# Patient Record
Sex: FEMALE | Race: BLACK OR AFRICAN AMERICAN | Employment: FULL TIME | URBAN - METROPOLITAN AREA
[De-identification: names, ages, dates, MRNs, and addresses within clinical notes are randomized per-mention and may not be internally consistent; named-entity substitution may affect disease eponyms.]

---

## 2017-01-08 ENCOUNTER — HOSPITAL ENCOUNTER (EMERGENCY)
Age: 47
Discharge: HOME | End: 2017-01-08
Attending: EMERGENCY MEDICINE
Payer: COMMERCIAL

## 2017-01-08 VITALS
BODY MASS INDEX: 35.85 KG/M2 | HEART RATE: 103 BPM | DIASTOLIC BLOOD PRESSURE: 88 MMHG | TEMPERATURE: 98.4 F | HEIGHT: 64 IN | SYSTOLIC BLOOD PRESSURE: 133 MMHG | WEIGHT: 210 LBS | RESPIRATION RATE: 16 BRPM | OXYGEN SATURATION: 100 %

## 2017-01-08 DIAGNOSIS — R52 BODY ACHES: ICD-10-CM

## 2017-01-08 DIAGNOSIS — J02.0 ACUTE STREPTOCOCCAL PHARYNGITIS: Primary | ICD-10-CM

## 2017-01-08 LAB
DEPRECATED S PYO AG THROAT QL EIA: POSITIVE
FLUAV AG NPH QL IA: NEGATIVE
FLUBV AG NOSE QL IA: NEGATIVE

## 2017-01-08 PROCEDURE — 87804 INFLUENZA ASSAY W/OPTIC: CPT | Performed by: EMERGENCY MEDICINE

## 2017-01-08 PROCEDURE — 87880 STREP A ASSAY W/OPTIC: CPT | Performed by: EMERGENCY MEDICINE

## 2017-01-08 PROCEDURE — 99282 EMERGENCY DEPT VISIT SF MDM: CPT

## 2017-01-08 RX ORDER — AMOXICILLIN 875 MG/1
875 TABLET, FILM COATED ORAL 2 TIMES DAILY
Qty: 20 TAB | Refills: 0 | Status: SHIPPED | OUTPATIENT
Start: 2017-01-08 | End: 2017-01-18

## 2017-01-08 RX ORDER — IBUPROFEN 600 MG/1
600 TABLET ORAL
Qty: 20 TAB | Refills: 0 | Status: ON HOLD | OUTPATIENT
Start: 2017-01-08 | End: 2019-11-27 | Stop reason: SDUPTHER

## 2017-01-08 NOTE — LETTER
[facility] EMERGENCY DEPT 
[address] [facility] [address] [id number] [phone] Work/School Note Date: 1/8/2017 To Whom It May concern: Vivian Magdaleno was seen and treated today in the emergency room by the following provider(s): 
Nurse Practitioner: Milady Cohen NP. Vivian Magdaleno may return to work on  Jan 11. Sincerely, Milady Cohen NP

## 2017-01-08 NOTE — DISCHARGE INSTRUCTIONS
Sore Throat: Care Instructions  Your Care Instructions    Infection by bacteria or a virus causes most sore throats. Cigarette smoke, dry air, air pollution, allergies, and yelling can also cause a sore throat. Sore throats can be painful and annoying. Fortunately, most sore throats go away on their own. If you have a bacterial infection, your doctor may prescribe antibiotics. Follow-up care is a key part of your treatment and safety. Be sure to make and go to all appointments, and call your doctor if you are having problems. It's also a good idea to know your test results and keep a list of the medicines you take. How can you care for yourself at home? · If your doctor prescribed antibiotics, take them as directed. Do not stop taking them just because you feel better. You need to take the full course of antibiotics. · Gargle with warm salt water once an hour to help reduce swelling and relieve discomfort. Use 1 teaspoon of salt mixed in 1 cup of warm water. · Take an over-the-counter pain medicine, such as acetaminophen (Tylenol), ibuprofen (Advil, Motrin), or naproxen (Aleve). Read and follow all instructions on the label. · Be careful when taking over-the-counter cold or flu medicines and Tylenol at the same time. Many of these medicines have acetaminophen, which is Tylenol. Read the labels to make sure that you are not taking more than the recommended dose. Too much acetaminophen (Tylenol) can be harmful. · Drink plenty of fluids. Fluids may help soothe an irritated throat. Hot fluids, such as tea or soup, may help decrease throat pain. · Use over-the-counter throat lozenges to soothe pain. Regular cough drops or hard candy may also help. These should not be given to young children because of the risk of choking. · Do not smoke or allow others to smoke around you. If you need help quitting, talk to your doctor about stop-smoking programs and medicines.  These can increase your chances of quitting for good. · Use a vaporizer or humidifier to add moisture to your bedroom. Follow the directions for cleaning the machine. When should you call for help? Call your doctor now or seek immediate medical care if:  · You have new or worse trouble swallowing. · Your sore throat gets much worse on one side. Watch closely for changes in your health, and be sure to contact your doctor if you do not get better as expected. Where can you learn more? Go to http://eden-jay.info/. Enter [phone] in the search box to learn more about \"Sore Throat: Care Instructions. \"  Current as of: July 29, 2016  Content Version: 11.1  © 0580-0801 [employer]. Care instructions adapted under license by [employer] (which disclaims liability or warranty for this information). If you have questions about a medical condition or this instruction, always ask your healthcare professional. [address] any warranty or liability for your use of this information.

## 2017-01-08 NOTE — ED NOTES
.See Nursing Assessment      Emergency Department Nursing Plan of Care       The Nursing Plan of Care is developed from the Nursing assessment and Emergency Department Attending provider initial evaluation. The plan of care may be reviewed in the ED Provider note.     The Plan of Care was developed with the following considerations:   Patient / Family readiness to learn indicated by:verbalized understanding  Persons(s) to be included in education: patient  Barriers to Learning/Limitations:No    Signed     Armand Levine RN    1/8/2017   3:52 PM

## 2017-01-08 NOTE — ED PROVIDER NOTES
Patient is a 42 y.o. female presenting with general illness and sore throat. The history is provided by the patient. No  was used. Generalized Body Aches   This is a new problem. The current episode started yesterday. The problem occurs daily. The problem has not changed since onset. Pertinent negatives include no chest pain, no abdominal pain, no headaches and no shortness of breath. Nothing aggravates the symptoms. Nothing relieves the symptoms. She has tried nothing for the symptoms. Sore Throat    This is a new problem. The current episode started yesterday. There has been no fever. Pertinent negatives include no headaches, no shortness of breath, no swollen glands and no cough. She has had no exposure to strep. She has tried nothing for the symptoms. History reviewed. No pertinent past medical history. History reviewed. No pertinent past surgical history. History reviewed. No pertinent family history. Social History     Social History    Marital status:      Spouse name: N/A    Number of children: N/A    Years of education: N/A     Occupational History    Not on file. Social History Main Topics    Smoking status: Never Smoker    Smokeless tobacco: Not on file    Alcohol use Yes      Comment: social    Drug use: No    Sexual activity: Not on file     Other Topics Concern    Not on file     Social History Narrative         ALLERGIES: Review of patient's allergies indicates no known allergies. Review of Systems   Constitutional: Negative for fatigue and fever. HENT: Positive for sore throat. Respiratory: Negative for cough, shortness of breath and wheezing. Cardiovascular: Negative for chest pain and palpitations. Gastrointestinal: Negative for abdominal pain. Musculoskeletal: Negative for arthralgias, myalgias, neck pain and neck stiffness. Skin: Negative for pallor and rash.    Neurological: Negative for dizziness, tremors, weakness and headaches. Hematological: Negative for adenopathy. Psychiatric/Behavioral: Negative for agitation and behavioral problems. All other systems reviewed and are negative. Vitals:    01/08/17 1539   BP: 133/88   Pulse: (!) 103   Resp: 16   Temp: 98.4 °F (36.9 °C)   SpO2: 100%   Weight: 95.3 kg (210 lb)   Height: 5' 4\" (1.626 m)            Physical Exam   Constitutional: She is oriented to person, place, and time. She appears well-developed and well-nourished. No distress. HENT:   Head: Normocephalic and atraumatic. Right Ear: External ear normal.   Left Ear: External ear normal.   Nose: Nose normal.   Mouth/Throat: No oropharyngeal exudate. Posterior oropharynx erythema   Eyes: Conjunctivae are normal.   Neck: Normal range of motion. Neck supple. Cardiovascular: Normal rate, regular rhythm and normal heart sounds. Pulmonary/Chest: Effort normal and breath sounds normal. No respiratory distress. She has no wheezes. Abdominal: Soft. Bowel sounds are normal. There is no tenderness. Musculoskeletal: Normal range of motion. Lymphadenopathy:     She has no cervical adenopathy. Neurological: She is alert and oriented to person, place, and time. No cranial nerve deficit. Coordination normal.   Skin: Skin is warm and dry. No rash noted. Psychiatric: She has a normal mood and affect. Her behavior is normal. Judgment and thought content normal.   Nursing note and vitals reviewed. MDM  Number of Diagnoses or Management Options  Diagnosis management comments: DDX sore throat viral v bacterial  influenza    ED Course       Procedures    IMPRESSION  Strep pharyngitis  PLAN  Amoxicillin  Motrin  Follow up PCP  4:40 PM  I have discussed with patient their diagnosis, treatment, and follow up plan. The patient agrees to follow up as outlined in discharge paperwork and also to return to the ED with any worsening.  Marv Villagran NP

## 2017-01-18 ENCOUNTER — HOSPITAL ENCOUNTER (OUTPATIENT)
Dept: MAMMOGRAPHY | Age: 47
Discharge: HOME | End: 2017-01-18
Attending: INTERNAL MEDICINE
Payer: COMMERCIAL

## 2017-01-18 DIAGNOSIS — Z12.31 VISIT FOR SCREENING MAMMOGRAM: ICD-10-CM

## 2017-01-18 PROCEDURE — 77067 SCR MAMMO BI INCL CAD: CPT

## 2019-09-04 ENCOUNTER — OFFICE VISIT (OUTPATIENT)
Dept: OBGYN CLINIC | Age: 49
End: 2019-09-04

## 2019-09-04 VITALS
DIASTOLIC BLOOD PRESSURE: 84 MMHG | RESPIRATION RATE: 18 BRPM | WEIGHT: 166 LBS | SYSTOLIC BLOOD PRESSURE: 130 MMHG | HEART RATE: 84 BPM | HEIGHT: 64 IN | OXYGEN SATURATION: 98 % | BODY MASS INDEX: 28.34 KG/M2

## 2019-09-04 DIAGNOSIS — N93.9 ABNORMAL UTERINE BLEEDING (AUB): Primary | ICD-10-CM

## 2019-09-04 RX ORDER — MEDROXYPROGESTERONE ACETATE 10 MG/1
10 TABLET ORAL DAILY
Qty: 30 TAB | Refills: 4 | Status: SHIPPED | OUTPATIENT
Start: 2019-09-04 | End: 2019-11-22 | Stop reason: SDUPTHER

## 2019-09-04 NOTE — PROGRESS NOTES
Abnormal bleeding note      Rosalio Terry is a 54 y.o. female who complains of vaginal bleeding problems. Patient's periods have always been heavy. Regular periods around the same time each month. Very heavy. Last about 7 days. At times will restart bleeding about a week later. Patient has to use 2-3 pads, and often bleeds through pads. Lots of large clots. Periods are painful. Pain not only with clots. Her current method of family planning is tubal ligation. Patient anemic. Patient is currently on iron supplementation by PCP. Associated symptoms include none. Alleviating factors: none    Aggravating factors: none      The patient is sexually active. Last Pap smear: about 2 years per pt report. No hx of abnormal pap smears. Normal thyroid labs at PCP. PSH: BTL  POB: , SAB x1,  x3, tested to 8lb 13oz  PGYN: Hx of Trich in the past.  No abnormal paps. Due to come on her menstrual cycle around the 17th of this month. Her relevant past medical history: History reviewed. No pertinent past medical history. Past Surgical History:   Procedure Laterality Date    RAMAKRISHNA US BX BREAST RT 1ST LESION W/CLIP AND SPECIMEN Right     Neg     Social History     Occupational History    Not on file   Tobacco Use    Smoking status: Never Smoker    Smokeless tobacco: Never Used   Substance and Sexual Activity    Alcohol use: Yes     Comment: social    Drug use: No    Sexual activity: Yes     Partners: Male     History reviewed. No pertinent family history. No Known Allergies  Prior to Admission medications    Medication Sig Start Date End Date Taking? Authorizing Provider   ibuprofen (MOTRIN) 600 mg tablet Take 1 Tab by mouth every six (6) hours as needed for Pain.  17  Yes Latanya Townsend NP        Review of Systems - History obtained from the patient  Constitutional: negative for weight loss, fever, night sweats  HEENT: negative for hearing loss, earache, congestion, snoring, sorethroat  CV: negative for chest pain, palpitations, edema  Resp: negative for cough, shortness of breath, wheezing  Breast: negative for breast lumps, nipple discharge, galactorrhea  GI: negative for change in bowel habits, abdominal pain, black or bloody stools  : negative for frequency, dysuria, hematuria  MSK: negative for back pain, joint pain, muscle pain  Skin: negative for itching, rash, hives  Neuro: negative for dizziness, headache, confusion, weakness  Psych: negative for anxiety, depression, change in mood  Heme/lymph: negative for bleeding, bruising, pallor      Objective:    Visit Vitals  /84 (BP 1 Location: Right arm, BP Patient Position: Sitting)   Pulse 84   Resp 18   Ht 5' 4\" (1.626 m)   Wt 166 lb (75.3 kg)   SpO2 98%   BMI 28.49 kg/m²          PHYSICAL EXAMINATION    Constitutional  · Appearance: well-nourished, well developed, alert, in no acute distress    HENT  · Head and Face: appears normal    Neck  · Inspection/Palpation: normal appearance, no masses or tenderness  · Lymph Nodes: no lymphadenopathy present  · Thyroid: gland size normal, nontender, no nodules or masses present on palpation    Gastrointestinal  · Abdominal Examination: abdomen non-tender to palpation, normal bowel sounds, no masses present  · Liver and spleen: no hepatomegaly present, spleen not palpable  · Hernias: no hernias identified    Genitourinary  · External Genitalia: normal appearance for age, no discharge present, no tenderness present, no inflammatory lesions present, no masses present, no atrophy present  · Vagina: normal vaginal vault without central or paravaginal defects, no discharge present, no inflammatory lesions present, no masses present  · Bladder: non-tender to palpation  · Urethra: appears normal  · Cervix: normal   · Uterus: 12 week size uterus with anterior and right fundal fibroid.   Mildly tender  · Adnexa: no adnexal tenderness present, no adnexal masses present  · Perineum: perineum within normal limits, no evidence of trauma, no rashes or skin lesions present  · Anus: anus within normal limits, no hemorrhoids present  · Inguinal Lymph Nodes: no lymphadenopathy present    Skin  · General Inspection: no rash, no lesions identified    Neurologic/Psychiatric  · Mental Status:  · Orientation: grossly oriented to person, place and time  · Mood and Affect: mood normal, affect appropriate    Assessment:   54 y.o. with aub    Plan:   - provera 10mg daily  - ultrasound  - discussed options for control of periods. Will make final decision after ultrasound  - will get records of labs from PCP    Instructions given to pt. Handouts given to pt.     RTC in 2 weeks

## 2019-09-04 NOTE — PROGRESS NOTES
Chief Complaint   Patient presents with    Vaginal Bleeding     Pt reports her periods are once a month but she passes clots and there painful.    3 most recent PHQ Screens 9/4/2019   Little interest or pleasure in doing things Not at all   Feeling down, depressed, irritable, or hopeless Several days   Total Score PHQ 2 1     1. Have you been to the ER, urgent care clinic since your last visit? Hospitalized since your last visit? No    2. Have you seen or consulted any other health care providers outside of the [address] [place] [address] since your last visit? Include any pap smears or colon screening.  No

## 2019-09-04 NOTE — PATIENT INSTRUCTIONS
Abnormal Uterine Bleeding: Care Instructions  Your Care Instructions    Abnormal uterine bleeding (AUB) is irregular bleeding from the uterus that is longer or heavier than usual or does not occur at your regular time. Sometimes it is caused by changes in hormone levels. It can also be caused by growths in the uterus, such as fibroids or polyps. Sometimes a cause cannot be found. You may have heavy bleeding when you are not expecting your period. Your doctor may suggest a pregnancy test, if you think you are pregnant. Follow-up care is a key part of your treatment and safety. Be sure to make and go to all appointments, and call your doctor if you are having problems. It's also a good idea to know your test results and keep a list of the medicines you take. How can you care for yourself at home? · Be safe with medicines. Take pain medicines exactly as directed. ? If the doctor gave you a prescription medicine for pain, take it as prescribed. ? If you are not taking a prescription pain medicine, ask your doctor if you can take an over-the-counter medicine. · You may be low in iron because of blood loss. Eat a balanced diet that is high in iron and vitamin C. Foods rich in iron include red meat, shellfish, eggs, beans, and leafy green vegetables. Talk to your doctor about whether you need to take iron pills or a multivitamin. When should you call for help? Call 911 anytime you think you may need emergency care. For example, call if:    · You passed out (lost consciousness).    Call your doctor now or seek immediate medical care if:    · You have new or worse belly or pelvic pain.     · You have severe vaginal bleeding.     · You feel dizzy or lightheaded, or you feel like you may faint.    Watch closely for changes in your health, and be sure to contact your doctor if:    · You think you may be pregnant.     · Your bleeding gets worse.     · You do not get better as expected.    Where can you learn more?  Go to http://eden-jay.info/. Enter J397 in the search box to learn more about \"Abnormal Uterine Bleeding: Care Instructions. \"  Current as of: February 19, 2019  Content Version: 12.1  © 2711-6430 [employer]. Care instructions adapted under license by [employer] (which disclaims liability or warranty for this information). If you have questions about a medical condition or this instruction, always ask your healthcare professional. [address] any warranty or liability for your use of this information. Pelvic Ultrasound for Women: About This Test  What is it? A pelvic ultrasound test uses sound waves to make a picture of the inside of the lower belly (pelvis). It allows your doctor to see your bladder, cervix, uterus, fallopian tubes, and ovaries. The sound waves create a picture on a video monitor. The test can be done in two ways:  · Transabdominal. A small handheld device (transducer) is passed back and forth over your lower belly. · Transvaginal. A thin, lubricated transducer is placed in your vagina. Why is this test done? A pelvic ultrasound test is done to:  · Find the cause of urinary problems. · Find out what's causing pelvic pain. · Look for causes of vaginal bleeding and menstrual problems. · Check for growths or masses like ovarian cysts or uterine fibroids. · See if a fertilized egg is growing outside the uterus. This is called a tubal pregnancy. · Confirm the stage of a pregnancy and check the baby's heartbeat. · Look for the correct placement of an intrauterine device (IUD). How can you prepare for the test?  · If you are having a transabdominal ultrasound, your doctor will ask you to drink 4 to 6 glasses of juice or water about an hour before the test. This will fill your bladder. If you can't fill your bladder, it can be filled with water through a thin, flexible tube (catheter).   · If you are having both transabdominal and transvaginal ultrasounds done, you'll start with a full bladder. You will be asked to empty it before the transvaginal ultrasound. What happens before the test?  · You will need to remove any jewelry that might be in the way of the ultrasound. · You will need to take off most of your clothes below the waist. You'll be given a gown to wear during the test.  What happens during the test?  For a transabdominal ultrasound:  · You lie down on your back on an exam table. · A warm gel will be spread on your lower belly. This improves the transmission of the sound waves. The handheld transducer is pressed against your belly and gently moved back and forth. A picture of the organs can be seen on a video monitor. For a transvaginal ultrasound:  · You lie down on your back on an exam table with your hips slightly raised. · The tip of a thin, lubricated transducer probe is gently inserted into your vagina. The transducer may be moved around to get a complete view. The images from the test are shown on a video monitor. What else should you know about the test?  · With a transabdominal ultrasound, you will feel light pressure from the transducer as it passes over your belly. If you have an injury or pelvic pain, the pressure may be painful. · With a transvaginal ultrasound, you may feel some discomfort from the transducer probe as it is put into your vagina. · You will not hear or feel the sound waves. How long does the test take? · The transabdominal ultrasound test will take about 30 minutes. · The transvaginal ultrasound test will take 15 to 30 minutes. What happens after the test?  · You will probably be able to go home right away. · You can go back to your usual activities right away. Follow-up care is a key part of your treatment and safety. Be sure to make and go to all appointments, and call your doctor if you are having problems.  It's also a good idea to keep a list of the medicines you take. Ask your doctor when you can expect to have your test results. Where can you learn more? Go to http://eden-jay.info/. Enter H739 in the search box to learn more about \"Pelvic Ultrasound for Women: About This Test.\"  Current as of: February 19, 2019  Content Version: 12.1  © 5703-4663 [employer]. Care instructions adapted under license by [employer] (which disclaims liability or warranty for this information). If you have questions about a medical condition or this instruction, always ask your healthcare professional. [address] any warranty or liability for your use of this information. Medroxyprogesterone (By mouth)   Medroxyprogesterone ([id number]-OPAL-[id number]-one)  Treats menstruation problems caused by a hormone imbalance. Prevents overgrowth of the uterine lining in women who are taking estrogen. Brand Name(s): Provera   There may be other brand names for this medicine. When This Medicine Should Not Be Used: This medicine is not right for everyone. Do not use it if you had an allergic reaction to medroxyprogesterone, if you are pregnant, or if you have liver disease, unusual vaginal bleeding that has not been checked by a doctor, or a history of breast cancer or blood clots. How to Use This Medicine:   Tablet  · Take your medicine as directed. Your dose may need to be changed several times to find what works best for you. · Read and follow the patient instructions that come with this medicine. Talk to your doctor or pharmacist if you have any questions. · Missed dose: Take a dose as soon as you remember. If it is almost time for your next dose, wait until then and take a regular dose. Do not take extra medicine to make up for a missed dose. · Store the medicine in a closed container at room temperature, away from heat, moisture, and direct light.   Drugs and Foods to Avoid:      Ask your doctor or pharmacist before using any other medicine, including over-the-counter medicines, vitamins, and herbal products. Warnings While Using This Medicine:   · It is not safe to take this medicine during pregnancy. It could harm an unborn baby. Tell your doctor right away if you become pregnant. · Tell your doctor if you are breastfeeding or if you have kidney disease, heart disease, asthma, diabetes, endometriosis, high blood pressure, high cholesterol, lupus, porphyria, migraines, thyroid problems, or a history of seizures or cancer. Tell your doctor if you smoke. · This medicine may increase your risk for the following:   ¨ Blood clots, which could lead to stroke or heart attack  ¨ Dementia (when used with estrogen in women older than 65)  ¨ Breast or endometrial cancer (when used with estrogen)  · Tell any doctor who treats you that you use this medicine. You may need to stop taking it before you have surgery or if you need to be on bedrest.  · Tell any doctor or dentist who treats you that you are using this medicine. This medicine may affect certain medical test results. · Your doctor will check your progress and the effects of this medicine at regular visits. Keep all appointments. · Keep all medicine out of the reach of children. Never share your medicine with anyone.   Possible Side Effects While Using This Medicine:   Call your doctor right away if you notice any of these side effects:  · Allergic reaction: Itching or hives, swelling in your face or hands, swelling or tingling in your mouth or throat, chest tightness, trouble breathing  · Breast lump, pain, or tenderness  · Chest pain, trouble breathing, coughing up blood  · Loss of vision, blurred vision  · Numbness or weakness on one side of your body, sudden or severe headache, problems with speech or walking, pain in your lower leg (calf)  · Rapid weight gain, swelling in your hands, ankles, or feet  · Severe or unusual vaginal bleeding  · Sudden and severe stomach pain, nausea, vomiting, fever, lightheadedness  · Yellow skin or eyes  If you notice these less serious side effects, talk with your doctor:   · Depression, headache, dizziness, trouble sleeping  · Light vaginal bleeding or spotting  · Mild stomach pain or cramps  If you notice other side effects that you think are caused by this medicine, tell your doctor. Call your doctor for medical advice about side effects. You may report side effects to FDA at 9-523-HEC-0571  © 2017 [address]  Information is for End User's use only and may not be sold, redistributed or otherwise used for commercial purposes. The above information is an  only. It is not intended as medical advice for individual conditions or treatments. Talk to your doctor, nurse or pharmacist before following any medical regimen to see if it is safe and effective for you.

## 2019-09-16 ENCOUNTER — HOSPITAL ENCOUNTER (OUTPATIENT)
Dept: ULTRASOUND IMAGING | Age: 49
Discharge: HOME | End: 2019-09-16
Attending: OBSTETRICS & GYNECOLOGY
Payer: COMMERCIAL

## 2019-09-16 DIAGNOSIS — N93.9 ABNORMAL UTERINE BLEEDING (AUB): ICD-10-CM

## 2019-09-16 PROCEDURE — 76856 US EXAM PELVIC COMPLETE: CPT

## 2019-09-16 PROCEDURE — 76830 TRANSVAGINAL US NON-OB: CPT

## 2019-09-26 ENCOUNTER — OFFICE VISIT (OUTPATIENT)
Dept: OBGYN CLINIC | Age: 49
End: 2019-09-26

## 2019-09-26 VITALS
DIASTOLIC BLOOD PRESSURE: 82 MMHG | TEMPERATURE: 98.2 F | RESPIRATION RATE: 18 BRPM | HEIGHT: 64 IN | SYSTOLIC BLOOD PRESSURE: 127 MMHG | OXYGEN SATURATION: 99 % | BODY MASS INDEX: 28.51 KG/M2 | WEIGHT: 167 LBS | HEART RATE: 82 BPM

## 2019-09-26 DIAGNOSIS — Z30.42 ENCOUNTER FOR DEPO-PROVERA CONTRACEPTION: ICD-10-CM

## 2019-09-26 DIAGNOSIS — N93.9 ABNORMAL UTERINE BLEEDING (AUB): Primary | ICD-10-CM

## 2019-09-26 DIAGNOSIS — D21.9 FIBROIDS: ICD-10-CM

## 2019-09-26 DIAGNOSIS — Z30.013 ENCOUNTER FOR INITIAL PRESCRIPTION OF INJECTABLE CONTRACEPTIVE: ICD-10-CM

## 2019-09-26 RX ORDER — MEDROXYPROGESTERONE ACETATE 150 MG/ML
150 INJECTION, SUSPENSION INTRAMUSCULAR ONCE
Qty: 1 ML | Refills: 0 | Status: SHIPPED | COMMUNITY
Start: 2019-09-26 | End: 2019-09-26

## 2019-09-26 RX ORDER — MEDROXYPROGESTERONE ACETATE 150 MG/ML
150 INJECTION, SUSPENSION INTRAMUSCULAR
Qty: 1 ML | Refills: 4 | Status: SHIPPED | OUTPATIENT
Start: 2019-09-26 | End: 2019-11-27

## 2019-09-26 NOTE — PROGRESS NOTES
Abnormal bleeding note      Dustin Wright is a 54 y.o. female for follow up of vaginal bleeding problems. Still had period with provera, and stayed on for 2 weeks. Ultrasound showed:  EXAM:  US PELV NON OBS, US TRANSVAGINAL     INDICATION:  Abnormal uterine bleeding     COMPARISON:  None.     TECHNIQUE: Transabdominal and transvaginal ultrasound of the female pelvis.     FINDINGS: Transabdominal ultrasound:  Urinary bladder: within normal limits.     Uterus: measures 9.5 x 6.2 x 7.0 cm. There are multiple uterine fibroids  including representative fibroids that measure 3.1 cm on the left, 3.0 cm in the  midline, and 3.2 cm on the right.     Endometrium: 6 mm     Right ovary: 1.5 x 1.3 x 2.2 cm. Blood flow is present in the right ovary. No  adnexal mass or cyst.     Left ovary: 2.8 x 1.8 x 1.2 cm. Blood flow is present in the left ovary. No  adnexal mass or cyst.     Free fluid: None.     Endovaginal ultrasound:   Uterus: measures 10.5 x 6.0 x 4.3 cm. There are multiple uterine fibroids  including representative fibroids that measure 2.7 cm on the left, 2.5 cm in the  midline, and 3.3 cm on the right.     Endometrium: Not seen due to uterine fibroids.     The ovaries are not seen due to bowel gas.     Free fluid: None.     IMPRESSION  IMPRESSION:   Multiple uterine fibroids measuring up to 3.3 cm. Otherwise, normal appearance  of the uterus and ovaries. From prior notes:  Patient's periods have always been heavy.     Regular periods around the same time each month. Very heavy. Last about 7 days. At times will restart bleeding about a week later. Patient has to use 2-3 pads, and often bleeds through pads. Lots of large clots. Periods are painful. Pain not only with clots.     Her current method of family planning is tubal ligation. Patient anemic.   Patient is currently on iron supplementation by PCP.         Associated symptoms include none.     Alleviating factors: none     Aggravating factors: none       The patient is sexually active.     Last Pap smear: about 2 years per pt report. No hx of abnormal pap smears.     Normal thyroid labs at PCP.     PSH: BTL  POB: [id number], SAB x1,  x3, tested to 8lb 13oz  PGYN: Hx of Trich in the past.  No abnormal paps.     Due to come on her menstrual cycle around the 17th of this month.       Her relevant past medical history: History reviewed. No pertinent past medical history. Past Surgical History:   Procedure Laterality Date    RAMAKRISHNA US BX BREAST RT 1ST LESION W/CLIP AND SPECIMEN Right     Neg     Social History     Occupational History    Not on file   Tobacco Use    Smoking status: Never Smoker    Smokeless tobacco: Never Used   Substance and Sexual Activity    Alcohol use: Yes     Comment: social    Drug use: No    Sexual activity: Yes     Partners: Male     History reviewed. No pertinent family history. No Known Allergies  Prior to Admission medications    Medication Sig Start Date End Date Taking? Authorizing Provider   medroxyPROGESTERone (PROVERA) 10 mg tablet Take 1 Tab by mouth daily. 19  Yes Geoff Lawson MD   ibuprofen (MOTRIN) 600 mg tablet Take 1 Tab by mouth every six (6) hours as needed for Pain.  17   Shelly Pap, NP        Review of Systems - History obtained from the patient  Constitutional: negative for weight loss, fever, night sweats  HEENT: negative for hearing loss, earache, congestion, snoring, sorethroat  CV: negative for chest pain, palpitations, edema  Resp: negative for cough, shortness of breath, wheezing  Breast: negative for breast lumps, nipple discharge, galactorrhea  GI: negative for change in bowel habits, abdominal pain, black or bloody stools  : negative for frequency, dysuria, hematuria  MSK: negative for back pain, joint pain, muscle pain  Skin: negative for itching, rash, hives  Neuro: negative for dizziness, headache, confusion, weakness  Psych: negative for anxiety, depression, change in mood  Heme/lymph: negative for bleeding, bruising, pallor      Objective:    Visit Vitals  /82 (BP 1 Location: Left arm, BP Patient Position: Sitting)   Pulse 82   Temp 98.2 °F (36.8 °C) (Oral)   Resp 18   Ht 5' 4\" (1.626 m)   Wt 167 lb (75.8 kg)   LMP 09/02/2019 (Approximate)   SpO2 99%   BMI 28.67 kg/m²          PHYSICAL EXAMINATION    Constitutional  · Appearance: well-nourished, well developed, alert, in no acute distress    HENT  · Head and Face: appears normal    Neck  · Inspection/Palpation: normal appearance, no masses or tenderness  · Lymph Nodes: no lymphadenopathy present  · Thyroid: gland size normal, nontender, no nodules or masses present on palpation    Gastrointestinal  · Abdominal Examination: abdomen non-tender to palpation, normal bowel sounds, no masses present  · Liver and spleen: no hepatomegaly present, spleen not palpable  · Hernias: no hernias identified    Genitourinary  Deferred    Skin  · General Inspection: no rash, no lesions identified    Neurologic/Psychiatric  · Mental Status:  · Orientation: grossly oriented to person, place and time  · Mood and Affect: mood normal, affect appropriate    Assessment:   54 y.o. with AUB and pain due to fibroids    Plan:   - discussed options including Depo-provera, mirena, ablation, and hysterectomy. Risks and benefits reviewed. Patient would like to try Depo for now. - prescription for DepoProvera given. - if fails DepoProvera, will get endometrial biopsy      Instructions given to pt. Handouts given to pt.     RTC 3 months

## 2019-09-26 NOTE — PROGRESS NOTES
Chief Complaint   Patient presents with    Ultrasound     Pt reports she's still bleeding despite taking the Provera. 3 most recent PHQ Screens 9/26/2019   Little interest or pleasure in doing things Not at all   Feeling down, depressed, irritable, or hopeless -   Total Score PHQ 2 -     1. Have you been to the ER, urgent care clinic since your last visit? Hospitalized since your last visit? No    2. Have you seen or consulted any other health care providers outside of the [address] [place] [address] since your last visit? Include any pap smears or colon screening. No     150 mg of Depo was  administered intramuscularly in the pt's LVG per Dr Albert Crowley verbal order  with verbal consent received from patient and two patient identifiers used. No UPT, within dates. Patient tolerated well with no reactions observed for ten minutes post injection. Next injection dates were written down for the patient and the patient was encouraged to schedule next injection at checkout. Patient verbalized understanding.     Lot # [id number]  Exp- 6/1/2021  [id number]

## 2019-09-26 NOTE — PATIENT INSTRUCTIONS
Learning About Birth Control: The Shot  What is the shot? The shot is used to prevent pregnancy. You get the shot in your upper arm or rear end (buttocks). The shot gives you a dose of the hormone progestin. The shot is often called by its brand name, Depo-Provera. Progestin prevents pregnancy in these ways: It thickens the mucus in the cervix. This makes it hard for sperm to travel into the uterus. It also thins the lining of the uterus, which makes it harder for a fertilized egg to attach to the uterus. Progestin can sometimes stop the ovaries from releasing an egg each month (ovulation). The shot provides birth control for 3 months at a time. You then need another shot. The shot may cause bone loss. Talk to your doctor about the risks and benefits. How well does it work? In the first year of use:  · When the shot is used exactly as directed, fewer than 1 woman out of 100 has an unplanned pregnancy. · When the shot is not used exactly as directed, 6 women out of 100 have an unplanned pregnancy. Be sure to tell your doctor about any health problems you have or medicines you take. He or she can help you choose the birth control method that is right for you. What are the advantages of the shot? · The shot is one of the most effective methods of birth control. · It's convenient. You need to get a shot only once every 3 months to prevent pregnancy. You don't have to interrupt sex to protect against pregnancy. · It prevents pregnancy for 3 months at a time. You don't have to worry about birth control for this time. · It's safe to use while breastfeeding. · The shot may reduce heavy bleeding and cramping. · The shot doesn't contain estrogen. So you can use it if you don't want to take estrogen or can't take estrogen because you have certain health problems or concerns. What are the disadvantages of the shot?   · The shot doesn't protect against sexually transmitted infections (STIs), such as herpes or HIV/AIDS. If you aren't sure if your sex partner might have an STI, use a condom to protect against disease. · The shot may cause bone loss in some women. Talk to your doctor about the risks and benefits. · The shot is needed every 3 months. Any side effects may last 3 months or longer. ? The shot may cause irregular periods, or you may have spotting between periods. You may also stop getting a period. Some women see having no period as an advantage. ? It may cause mood changes, less interest in sex, or weight gain. · If you want to get pregnant, it may take up to 18 months after you stop getting the shot. This is because the hormones the shot provided have to leave your system, and your body has to readjust.  Where can you learn more? Go to http://eden-jay.info/. Enter U973 in the search box to learn more about \"Learning About Birth Control: The Shot. \"  Current as of: May 29, 2019  Content Version: 12.2  © 0894-2562 [employer]. Care instructions adapted under license by [employer] (which disclaims liability or warranty for this information). If you have questions about a medical condition or this instruction, always ask your healthcare professional. [address] any warranty or liability for your use of this information.

## 2019-10-11 ENCOUNTER — TELEPHONE (OUTPATIENT)
Dept: OBGYN CLINIC | Age: 49
End: 2019-10-11

## 2019-10-11 NOTE — TELEPHONE ENCOUNTER
Called and advised pt per Dr. Alyssa Iyer that there can be some irregular bleeding when starting DepoProvera, especially in the first month, as her body adjusts. If there is no improvement within 1 month of the shot, she should come in for re-evaluation. Pt was appreciative an verbalized understanding.

## 2019-10-11 NOTE — TELEPHONE ENCOUNTER
There can be some irregular bleeding when starting DepoProvera, especially in the first month, as her body adjusts. If there is no improvement within 1 month of the shot, she should come in for re-evaluation. Thank you.

## 2019-10-11 NOTE — TELEPHONE ENCOUNTER
Pt called stating that she had depo on 9/26/19 and now the bleeding and clots have startyed again and she isn't due for depo until 12/12/19. She feels like it has gotten worse. Needs advice.     Return call [phone]

## 2019-11-01 ENCOUNTER — TELEPHONE (OUTPATIENT)
Dept: OBGYN CLINIC | Age: 49
End: 2019-11-01

## 2019-11-01 NOTE — TELEPHONE ENCOUNTER
Attempted to contact the patient, message received that the person cannot accept calls at this time.

## 2019-11-01 NOTE — TELEPHONE ENCOUNTER
Patient should schedule follow up visit for endometrial biopsy and to discuss alternative options for treatment. Thank you.

## 2019-11-01 NOTE — TELEPHONE ENCOUNTER
Pt state that she came in for her irregular cycle. She taken the Depo to help with her cycle, but it is not helping. She is still bleeding with clots and a lot of pain.  She would like the nurse to call her

## 2019-11-07 ENCOUNTER — TELEPHONE (OUTPATIENT)
Dept: OBGYN CLINIC | Age: 49
End: 2019-11-07

## 2019-11-07 RX ORDER — MEDROXYPROGESTERONE ACETATE 10 MG/1
10 TABLET ORAL DAILY
Qty: 30 TAB | Refills: 1 | Status: SHIPPED | OUTPATIENT
Start: 2019-11-07 | End: 2019-11-27

## 2019-11-07 NOTE — TELEPHONE ENCOUNTER
Patient called the office and stated she is passing huge clots pt desires something to stop the bleeding pt was advised provider would be made aware.

## 2019-11-08 NOTE — PROGRESS NOTES
Please disregard previous note from today dated 11/8/19 @ 9:51 am.      I spoke with the patient informing her that a Rx for Provera had been sent to the pharmacy for her. Pt stated that she has been on her cycle since 10/17/19. Pt states she has been bleeding everyday with \"huge clots. \" Pt is concerned, and states she will  the Rx from the pharmacy today. Pt informed that Dr. Jose You is in surgery today, but a message will be sent to him. Understanding was verbalized to all. Pt states may leave message on voicemail.

## 2019-11-08 NOTE — TELEPHONE ENCOUNTER
Spoke with the patient earlier today. Pt states she started bleeding on 10/17/19, and has been bleeding everyday since. Pt states she is passing \"huge clots. \"  Pt stated she will  Rx for Provera from the pharmacy today. Information was relayed to Dr. Albania Rios. May leave message on pt's voicemail. Pharmacy on file is correct.

## 2019-11-08 NOTE — PROGRESS NOTES
Left VM message for pt to check with her pharmacy regarding her call to the office earlier this week.

## 2019-11-13 ENCOUNTER — HOSPITAL ENCOUNTER (OUTPATIENT)
Dept: LAB | Age: 49
Discharge: HOME | End: 2019-11-13

## 2019-11-13 ENCOUNTER — OFFICE VISIT (OUTPATIENT)
Dept: OBGYN CLINIC | Age: 49
End: 2019-11-13

## 2019-11-13 VITALS
HEIGHT: 64 IN | DIASTOLIC BLOOD PRESSURE: 90 MMHG | SYSTOLIC BLOOD PRESSURE: 150 MMHG | HEART RATE: 103 BPM | WEIGHT: 166 LBS | RESPIRATION RATE: 18 BRPM | OXYGEN SATURATION: 98 % | BODY MASS INDEX: 28.34 KG/M2

## 2019-11-13 DIAGNOSIS — N93.9 ABNORMAL UTERINE BLEEDING (AUB): Primary | ICD-10-CM

## 2019-11-13 DIAGNOSIS — D21.9 FIBROIDS: ICD-10-CM

## 2019-11-13 NOTE — PROGRESS NOTES
Chief Complaint   Patient presents with    Procedure     EMB     Pt presents in office with c/o vaginal bleeding x 1 month. 3 most recent PHQ Screens 11/13/2019   Little interest or pleasure in doing things Not at all   Feeling down, depressed, irritable, or hopeless Not at all   Total Score PHQ 2 0     1. Have you been to the ER, urgent care clinic since your last visit? Hospitalized since your last visit? No    2. Have you seen or consulted any other health care providers outside of the [address] [place] [address] since your last visit? Include any pap smears or colon screening.  No

## 2019-11-13 NOTE — PROCEDURES
Endometrial biopsy    Verbal and written consent obtained. Patient placed in lithotomy position. Cervix visualized with a speculum, then sterilized with Betadine. Cervical os and anterior lip of cervix anesthetized with topical benzocaine. An attempt was made to pass the Explora curette through the cervical os failed. Anterior lip of cervix then grasped with a single tooth tenaculum. Explora curette was then able to be inserted into the uterine cavity. Uterus sounded to  10 cm. Sample was obtained from 360 degrees of rotation and all 4 quadrants. Moderate amount of sample obtained. Tenaculum removed from anterior cervical lip. Hemostasis was noted to be excellent. Speculum removed from vagina. Pt tolerated the procedure well, and there were no complications. Specimen sent to pathology.

## 2019-11-13 NOTE — PATIENT INSTRUCTIONS
Endometrial Biopsy: About This Test  What is it? An endometrial biopsy is a way for your doctor to take a small sample of the lining of the uterus (endometrium). The sample is looked at under a microscope for abnormal cells. An endometrial biopsy helps your doctor find problems in the endometrium. Why is this test done? An endometrial biopsy is done to check for cancer of the uterus. The test is also done if you have abnormal bleeding from your uterus. The test results show how your body's hormones are affecting the lining of the uterus, such as during menopause. How do you prepare for the test?  · If you take aspirin or some other blood thinner, ask your doctor if you should stop taking it before your test. Make sure that you understand exactly what your doctor wants you to do. These medicines increase the risk of bleeding. · Ask your doctor if you should take a pain reliever, such as ibuprofen (Advil or Motrin), 30 to 60 minutes before the test. This can help reduce any cramping pain that the test can cause. What happens before the test?  · You will empty your bladder just before the test.  · You will be asked to sign a consent form that says you understand the risks of the test and agree to have it done. How is the test done? · You will lie on an exam table. Your feet will be in stirrups. · The doctor may use a spray or injection to numb your cervix. The cervix is the opening to the uterus. · The doctor will use a tool called a speculum to see the cervix. · Then the doctor will pass a thin tube through the cervix to take a sample of the uterus lining. · The sample is sent to a lab. How long does the test take? The test will take about 5 to 15 minutes. What happens after the test?  · You will probably be able to go home right away. · You likely will have mild vaginal bleeding and may have cramps for a few days after the test. The cramps may feel like bad menstrual cramps.   Follow-up care is a key part of your treatment and safety. Be sure to make and go to all appointments, and call your doctor if you are having problems. It's also a good idea to keep a list of the medicines you take. Ask your doctor when you can expect to have your test results. Where can you learn more? Go to http://eden-jay.info/. Enter [phone] in the search box to learn more about \"Endometrial Biopsy: About This Test.\"  Current as of: February 19, 2019  Content Version: 12.2  © 0191-3262 [employer]. Care instructions adapted under license by [employer] (which disclaims liability or warranty for this information). If you have questions about a medical condition or this instruction, always ask your healthcare professional. [address] any warranty or liability for your use of this information. Laparoscopic Hysterectomy: Before Your Surgery  What is a laparoscopic hysterectomy? A hysterectomy is surgery to take out the uterus. In some cases, the ovaries and fallopian tubes also are taken out at the same time. The doctor makes one or more small cuts in the belly. These cuts are called incisions. They let the doctor insert tools to do the surgery. One of these tools is a tube with a light on it. It's called a laparoscope, or scope. The scope and the other tools allow the doctor to free the uterus. The doctor then removes the uterus through the small cuts. In a total hysterectomy, the doctor takes out the uterus and the cervix. In a supracervical hysterectomy, only the uterus is taken out. Most women go home in 1 to 2 days. You may need about 4 to 6 weeks to fully recover. After the surgery, you will not have periods. You will not be able to get pregnant. If there is a chance that you will want to have a baby, talk to your doctor about other treatment options. Your doctor may advise you to take hormone pills if your ovaries are removed.  Your doctor will talk to you about the risks and benefits of hormones. He or she will also tell you how long to take them. This surgery probably won't lower your interest in sex. In fact, some women enjoy sex more. This may be because they no longer have to worry about birth control or heavy bleeding. Follow-up care is a key part of your treatment and safety. Be sure to make and go to all appointments, and call your doctor if you are having problems. It's also a good idea to know your test results and keep a list of the medicines you take. What happens before surgery?   Surgery can be stressful. This information will help you understand what you can expect. And it will help you safely prepare for surgery.   Preparing for surgery    · Understand exactly what surgery is planned, along with the risks, benefits, and other options. · Tell your doctors ALL the medicines, vitamins, supplements, and herbal remedies you take. Some of these can increase the risk of bleeding or interact with anesthesia.     · If you take blood thinners, such as warfarin (Coumadin), clopidogrel (Plavix), or aspirin, be sure to talk to your doctor. He or she will tell you if you should stop taking these medicines before your surgery. Make sure that you understand exactly what your doctor wants you to do.     · Your doctor will tell you which medicines to take or stop before your surgery. You may need to stop taking certain medicines a week or more before surgery. So talk to your doctor as soon as you can.     · If you have an advance directive, let your doctor know. It may include a living will and a durable power of  for health care. Bring a copy to the hospital. If you don't have one, you may want to prepare one. It lets your doctor and loved ones know your health care wishes. Doctors advise that everyone prepare these papers before any type of surgery or procedure. What happens on the day of surgery?    · Follow the instructions exactly about when to stop eating and drinking. If you don't, your surgery may be canceled. If your doctor told you to take your medicines on the day of surgery, take them with only a sip of water.     · Take a bath or shower before you come in for your surgery. Do not apply lotions, perfumes, deodorants, or nail polish.     · Do not shave the surgical site yourself.     · Take off all jewelry and piercings. And take out contact lenses, if you wear them.    At the hospital or surgery center   · Bring a picture ID.     · The area for surgery is often marked to make sure there are no errors.     · You will be kept comfortable and safe by your anesthesia provider. You will be asleep during the surgery.     · The surgery will take about 2 to 4 hours. Going home   · Be sure you have someone to drive you home. Anesthesia and pain medicine make it unsafe for you to drive.     · You will be given more specific instructions about recovering from your surgery. They will cover things like diet, wound care, follow-up care, driving, and getting back to your normal routine. When should you call your doctor? · You have questions or concerns.     · You don't understand how to prepare for your surgery.     · You become ill before the surgery (such as fever, flu, or a cold).     · You need to reschedule or have changed your mind about having the surgery. Where can you learn more? Go to http://eden-jay.info/. Enter D130 in the search box to learn more about \"Laparoscopic Hysterectomy: Before Your Surgery. \"  Current as of: February 19, 2019  Content Version: 12.2  © 6607-6304 [employer]. Care instructions adapted under license by [employer] (which disclaims liability or warranty for this information).  If you have questions about a medical condition or this instruction, always ask your healthcare professional. [address] any warranty or liability for your use of this information. Laparoscopic Hysterectomy: What to Expect at [facility]    A hysterectomy is surgery to take out the uterus. In some cases, the ovaries and fallopian tubes also are taken out at the same time. You can expect to feel better and stronger each day, but you may need pain medicine for a week or two. You may get tired easily or have less energy than usual. The tiredness may last for several weeks after surgery. You will probably notice that your belly is swollen and puffy. This is common. The swelling will take several weeks to go down. You may take about 4 to 6 weeks to fully recover. It is important to avoid lifting while you are recovering so that you can heal.  This care sheet gives you a general idea about how long it will take for you to recover. But each person recovers at a different pace. Follow the steps below to get better as quickly as possible. How can you care for yourself at home? Activity    · Rest when you feel tired.     · Be active. Walking is a good choice.     · Allow the area to heal. Don't move quickly or lift anything heavy until you are feeling better.     · You may shower 24 to 48 hours after surgery, if your doctor okays it. Pat the incision dry. Do not take a bath for the first 2 weeks, or until your doctor tells you it is okay.     · Ask your doctor when it is okay for you to have sex. Diet    · You can eat your normal diet. If your stomach is upset, try bland, low-fat foods like plain rice, broiled chicken, toast, and yogurt.     · If your bowel movements are not regular right after surgery, try to avoid constipation and straining. Drink plenty of water. Your doctor may suggest fiber, a stool softener, or a mild laxative. Medicines    · Your doctor will tell you if and when you can restart your medicines.  He or she will also give you instructions about taking any new medicines.     · If you take blood thinners, such as warfarin (Coumadin), clopidogrel (Plavix), or aspirin, be sure to talk to your doctor. He or she will tell you if and when to start taking those medicines again. Make sure that you understand exactly what your doctor wants you to do.     · Be safe with medicines. Read and follow all instructions on the label. ? If the doctor gave you a prescription medicine for pain, take it as prescribed. ? If you are not taking a prescription pain medicine, ask your doctor if you can take an over-the-counter medicine.     · If your doctor prescribed antibiotics, take them as directed. Do not stop taking them just because you feel better. You need to take the full course of antibiotics. Incision care    · You may have stitches over the cuts (incisions) the doctor made in your belly.     · If you have strips of tape on the cut (incision) the doctor made, leave the tape on for a week or until it falls off.     · Wash the area daily with warm, soapy water, and pat it dry. Don't use hydrogen peroxide or alcohol. They can slow healing.     · You may cover the area with a gauze bandage if it oozes fluid or rubs against clothing.     · Change the bandage every day. Other instructions    · You may have some light vaginal bleeding. Wear sanitary pads if needed. Do not douche or use tampons.     · Don't have sex until the doctor says it is okay. Follow-up care is a key part of your treatment and safety. Be sure to make and go to all appointments, and call your doctor if you are having problems. It's also a good idea to know your test results and keep a list of the medicines you take. When should you call for help? Call 911 anytime you think you may need emergency care.  For example, call if:    · You passed out (lost consciousness).     · You have chest pain, are short of breath, or cough up blood.    Call your doctor now or seek immediate medical care if:    · You have pain that does not get better after you take pain medicine.     · You cannot pass stools or gas.     · You have vaginal discharge that has increased in amount or smells bad.     · You are sick to your stomach or cannot drink fluids.     · You have loose stitches, or your incision comes open.     · Bright red blood has soaked through the bandage over your incision.     · You have signs of infection, such as:  ? Increased pain, swelling, warmth, or redness. ? Red streaks leading from the incision. ? Pus draining from the incision. ? A fever.     · You have bright red vaginal bleeding that soaks one or more pads in an hour, or you have large clots.     · You have signs of a blood clot in your leg (called a deep vein thrombosis), such as:  ? Pain in your calf, back of the knee, thigh, or groin. ? Redness and swelling in your leg or groin.    Watch closely for changes in your health, and be sure to contact your doctor if you have any problems. Where can you learn more? Go to http://eden-jay.info/. Enter Q131 in the search box to learn more about \"Laparoscopic Hysterectomy: What to Expect at Home. \"  Current as of: February 19, 2019  Content Version: 12.2  © 6517-7766 [employer]. Care instructions adapted under license by [employer] (which disclaims liability or warranty for this information). If you have questions about a medical condition or this instruction, always ask your healthcare professional. [address] any warranty or liability for your use of this information.

## 2019-11-13 NOTE — PROGRESS NOTES
Abnormal bleeding note      Buddy Jamil is a 54 y.o. female for follow up of AUB and fibroids    Initially started on PO provera, but bled through this. Then started DepoProvera on 9/26/19. She has continued bleeding on this. Given supplemental PO provera earlier this week. From prior notes:  9/26/19 -   Still had period with provera, and stayed on for 2 weeks.     Ultrasound showed:     FINDINGS: Transabdominal ultrasound:  Uterus: measures 9.5 x 6.2 x 7.0 cm. There are multiple uterine fibroids  including representative fibroids that measure 3.1 cm on the left, 3.0 cm in the  midline, and 3.2 cm on the right.     Endometrium: 6 mm     Right ovary: 1.5 x 1.3 x 2.2 cm. Blood flow is present in the right ovary. No  adnexal mass or cyst.     Left ovary: 2.8 x 1.8 x 1.2 cm. Blood flow is present in the left ovary. No  adnexal mass or cyst.     Endovaginal ultrasound:   Uterus: measures 10.5 x 6.0 x 4.3 cm. There are multiple uterine fibroids  including representative fibroids that measure 2.7 cm on the left, 2.5 cm in the  midline, and 3.3 cm on the right.     Endometrium: Not seen due to uterine fibroids.     The ovaries are not seen due to bowel gas.     IMPRESSION:   Multiple uterine fibroids measuring up to 3.3 cm. Otherwise, normal appearance  of the uterus and ovaries.     9/4/19  Patient's periods have always been heavy.     Regular periods around the same time each month.  Very heavy.  Last about 7 days.  At times will restart bleeding about a week later. Angie Goodrich has to use 2-3 pads, and often bleeds through pads.  Lots of large clots.  Periods are painful.  Pain not only with clots.     Her current method of family planning is tubal ligation.     Patient anemic.  Patient is currently on iron supplementation by PCP.     Associated symptoms include none.     Alleviating factors: none     Aggravating factors: none       The patient is sexually active.     Last Pap smear: about 2 years per pt report.  No hx of abnormal pap smears.     Normal thyroid labs at PCP.     PSH: BTL  POB: , SAB x1,  x3, tested to 8lb 13oz  PGYN: Hx of Trich in the past.  No abnormal paps.       Her relevant past medical history: History reviewed. No pertinent past medical history. Past Surgical History:   Procedure Laterality Date    RAMAKRISHNA US BX BREAST RT 1ST LESION W/CLIP AND SPECIMEN Right     Neg     Social History     Occupational History    Not on file   Tobacco Use    Smoking status: Never Smoker    Smokeless tobacco: Never Used   Substance and Sexual Activity    Alcohol use: Yes     Comment: social    Drug use: No    Sexual activity: Yes     Partners: Male     History reviewed. No pertinent family history. No Known Allergies  Prior to Admission medications    Medication Sig Start Date End Date Taking? Authorizing Provider   medroxyPROGESTERone (PROVERA) 10 mg tablet Take 1 Tab by mouth daily. 19  Yes Erica Otero MD   medroxyPROGESTERone (DEPO-PROVERA) 150 mg/mL injection 1 mL by IntraMUSCular route every three (3) months. 19  Yes Eriac Otero MD   medroxyPROGESTERone (PROVERA) 10 mg tablet Take 1 Tab by mouth daily. 19  Yes Erica Otero MD   ibuprofen (MOTRIN) 600 mg tablet Take 1 Tab by mouth every six (6) hours as needed for Pain.  17  Yes Portia Romo NP        Review of Systems - History obtained from the patient  Constitutional: negative for weight loss, fever, night sweats  HEENT: negative for hearing loss, earache, congestion, snoring, sorethroat  CV: negative for chest pain, palpitations, edema  Resp: negative for cough, shortness of breath, wheezing  Breast: negative for breast lumps, nipple discharge, galactorrhea  GI: negative for change in bowel habits, abdominal pain, black or bloody stools  : negative for frequency, dysuria, hematuria  MSK: negative for back pain, joint pain, muscle pain  Skin: negative for itching, rash, hives  Neuro: negative for dizziness, headache, confusion, weakness  Psych: negative for anxiety, depression, change in mood  Heme/lymph: negative for bleeding, bruising, pallor      Objective:    Visit Vitals  /90 (BP 1 Location: Left arm, BP Patient Position: Sitting)   Pulse (!) 103   Resp 18   Ht 5' 4\" (1.626 m)   Wt 166 lb (75.3 kg)   SpO2 98%   BMI 28.49 kg/m²          PHYSICAL EXAMINATION    Constitutional  · Appearance: well-nourished, well developed, alert, in no acute distress    HENT  · Head and Face: appears normal    Neck  · Inspection/Palpation: normal appearance, no masses or tenderness  · Lymph Nodes: no lymphadenopathy present  · Thyroid: gland size normal, nontender, no nodules or masses present on palpation    Gastrointestinal  · Abdominal Examination: abdomen non-tender to palpation, normal bowel sounds, no masses present  · Liver and spleen: no hepatomegaly present, spleen not palpable  · Hernias: no hernias identified    Genitourinary  · External Genitalia: normal appearance for age, no discharge present, no tenderness present, no inflammatory lesions present, no masses present, no atrophy present  · Vagina: normal vaginal vault without central or paravaginal defects, 3 scopettes of blood and clots present, no inflammatory lesions present, no masses present  · Bladder: non-tender to palpation  · Urethra: appears normal  · Cervix: normal   · Uterus: 12 week size uterus with anterior and right fundal fibroid.     · Adnexa: no adnexal tenderness present, no adnexal masses present  · Perineum: perineum within normal limits, no evidence of trauma, no rashes or skin lesions present  · Anus: anus within normal limits, no hemorrhoids present  · Inguinal Lymph Nodes: no lymphadenopathy present    Skin  · General Inspection: no rash, no lesions identified    Neurologic/Psychiatric  · Mental Status:  · Orientation: grossly oriented to person, place and time  · Mood and Affect: mood normal, affect appropriate    Assessment:   54 y.o. with AUB from fibroids, has failed DepoProvera. Plan:   - endometrial biopsy today  - Patient desires definitive therapy. Desires hysterectomy. Will schedule for Robotic assisted total laparoscopic hysterectomy, bilateral salpingectomy. Risks and benefits of surgery reviewed. This includes pain, bleeding, infection, and risk of damage to surrounding organs. All questions answered. Consent signed. Instructions given to pt. Handouts given to pt. Spent greater than 25 minutes with patient, over 50% of which was spent counselling.

## 2019-11-18 NOTE — PROGRESS NOTES
Please let patient know that her biopsy was normal other than an endometrial polyp (benign overgrowth in the lining of the uterus). These tend to bleed consistently and may have been the cause of your bleeding. I am happy to proceed with the hysterectomy as planned, though a D&C to remove the polyp may stop the bleeding (along with continuing DepoProvera). If she would like to continue with the hysterectomy, then we will continue with the hysterectomy as planned. If she would prefer a D&C, then we can change the planned surgery. She just needs to let us know. Thank you.

## 2019-11-20 DIAGNOSIS — D21.9 FIBROIDS: ICD-10-CM

## 2019-11-20 DIAGNOSIS — N93.9 ABNORMAL UTERINE BLEEDING (AUB): Primary | ICD-10-CM

## 2019-11-22 ENCOUNTER — HOSPITAL ENCOUNTER (OUTPATIENT)
Dept: PREADMISSION TESTING | Age: 49
Discharge: HOME | End: 2019-11-22
Payer: COMMERCIAL

## 2019-11-22 ENCOUNTER — TELEPHONE (OUTPATIENT)
Dept: OBGYN CLINIC | Age: 49
End: 2019-11-22

## 2019-11-22 VITALS
WEIGHT: 167.5 LBS | RESPIRATION RATE: 18 BRPM | BODY MASS INDEX: 28.6 KG/M2 | OXYGEN SATURATION: 100 % | HEART RATE: 76 BPM | TEMPERATURE: 98.4 F | HEIGHT: 64 IN

## 2019-11-22 DIAGNOSIS — N93.9 ABNORMAL UTERINE BLEEDING (AUB): ICD-10-CM

## 2019-11-22 DIAGNOSIS — D21.9 FIBROIDS: ICD-10-CM

## 2019-11-22 LAB
ERYTHROCYTE [DISTWIDTH] IN BLOOD BY AUTOMATED COUNT: 18.1 % (ref 11.5–14.5)
HCT VFR BLD AUTO: 20.3 % (ref 35–47)
HGB BLD-MCNC: 5.8 G/DL (ref 11.5–16)
MCH RBC QN AUTO: 24.6 PG (ref 26–34)
MCHC RBC AUTO-ENTMCNC: 28.6 G/DL (ref 30–36.5)
MCV RBC AUTO: 86 FL (ref 80–99)
NRBC # BLD: 0.03 K/UL (ref 0–0.01)
NRBC BLD-RTO: 0.6 PER 100 WBC
PLATELET # BLD AUTO: 499 K/UL (ref 150–400)
PMV BLD AUTO: 9 FL (ref 8.9–12.9)
RBC # BLD AUTO: 2.36 M/UL (ref 3.8–5.2)
WBC # BLD AUTO: 5.3 K/UL (ref 3.6–11)

## 2019-11-22 PROCEDURE — 85027 COMPLETE CBC AUTOMATED: CPT

## 2019-11-22 PROCEDURE — 86923 COMPATIBILITY TEST ELECTRIC: CPT

## 2019-11-22 PROCEDURE — 86900 BLOOD TYPING SEROLOGIC ABO: CPT

## 2019-11-22 PROCEDURE — 36415 COLL VENOUS BLD VENIPUNCTURE: CPT

## 2019-11-22 RX ORDER — ALBUTEROL SULFATE 90 UG/1
1 AEROSOL, METERED RESPIRATORY (INHALATION)
COMMUNITY

## 2019-11-22 NOTE — PERIOP NOTES
PRE-ADMISSION TESTING INTERVIEW COMPLETED WITH PATIENT. INSTRUCTIONS GIVEN ON USE OF CHLORHEXIDINE SOLUTION THE EVENING BEFORE AND THE MORNING OF SURGERY. SUICIDE SCREENING COMPLETED AND PATIENT SAID THAT IN THE LAST MONTH SHE HAS HAD THOUGHTS OF HER OWN DEATH BUT DENIES EVER WANTING TO ACT ON THEM. STATED THAT IF HER PCP NEEDED TO BE CONTACTED SHE WOULD NOT OBJECT. STATES THAT IN THE MONTH OF November SHE HAS LOST MANY FRIENDS AND FAMILY OVER THE YEARS AND WITH THAT AND THE APPROACHING HOLIDAYS, SHE GETS SAD AND  THINKS ABOUT DYING. ADVISED PATIENT TO CALL PCP IF SHE FEELS THAT SADNESS IS TOO OVERWHELMING TO HANDLE ON HER OWN. SHE AGREED TO CALL FOR   THOUGHTS OF KILLING HERSELF.    11/25/2019 0931  Tried to reach patient's PCP, Dr Bennett Davis, but had to leave a message for a call back-gave brief description of reason for call.

## 2019-11-25 ENCOUNTER — TELEPHONE (OUTPATIENT)
Dept: OBGYN CLINIC | Age: 49
End: 2019-11-25

## 2019-11-25 RX ORDER — SODIUM CHLORIDE 9 MG/ML
250 INJECTION, SOLUTION INTRAVENOUS AS NEEDED
Status: DISCONTINUED | OUTPATIENT
Start: 2019-11-25 | End: 2019-11-27 | Stop reason: HOSPADM

## 2019-11-25 RX ORDER — DIPHENHYDRAMINE HCL 25 MG
25 CAPSULE ORAL
Status: DISCONTINUED | OUTPATIENT
Start: 2019-11-25 | End: 2019-11-26 | Stop reason: SDUPTHER

## 2019-11-25 RX ORDER — ACETAMINOPHEN 325 MG/1
650 TABLET ORAL
Status: DISCONTINUED | OUTPATIENT
Start: 2019-11-25 | End: 2019-11-27 | Stop reason: HOSPADM

## 2019-11-25 NOTE — TELEPHONE ENCOUNTER
Spoke to patient regarding her low Hgb prior to surgery. Offered admission tonight for transfusion, but patient declined due to prior responsibilities. She is able to come in early tomorrow however. Will plan to admit to floor and start transfusion prior to surgery, then keep patient overnight postop to monitor. Patient is in agreement with plan.

## 2019-11-25 NOTE — TELEPHONE ENCOUNTER
Dr. Elisa Persaud spoke with the patient today. Dr. Elisa Persaud also spoke with Atif Sorto today from [facility]. Charlee's PAT) regarding pt's hemoglobin results.

## 2019-11-25 NOTE — PERIOP NOTES
MARY IN DR. MARROQUIN' OFFICE NOTIFIED OF THE FOLLOWING ABNORMAL LAB VALUES:  HGB-5.8  HCT-20.3  RBC-2.36  PLATELETS-499K  SHE WILL NOTIFY DR. Pavel Teague. SURGERY IS SCHEDULED FOR TOMORROW (11/26/19). RESULTS HAVE BEEN FAXED TO OFFICE WITH CONFIRMATION RECEIPT RECEIVED.

## 2019-11-26 ENCOUNTER — HOSPITAL ENCOUNTER (OUTPATIENT)
Age: 49
Discharge: HOME | End: 2019-11-27
Attending: OBSTETRICS & GYNECOLOGY | Admitting: OBSTETRICS & GYNECOLOGY
Payer: MEDICAID

## 2019-11-26 ENCOUNTER — ANESTHESIA (OUTPATIENT)
Dept: SURGERY | Age: 49
End: 2019-11-26
Payer: MEDICAID

## 2019-11-26 ENCOUNTER — ANESTHESIA EVENT (OUTPATIENT)
Dept: SURGERY | Age: 49
End: 2019-11-26
Payer: MEDICAID

## 2019-11-26 DIAGNOSIS — D21.9 FIBROIDS: ICD-10-CM

## 2019-11-26 DIAGNOSIS — N93.9 ABNORMAL UTERINE BLEEDING (AUB): ICD-10-CM

## 2019-11-26 PROBLEM — D64.9 ANEMIA: Status: ACTIVE | Noted: 2019-11-26

## 2019-11-26 PROBLEM — N92.0 MENORRHAGIA: Status: ACTIVE | Noted: 2019-11-26

## 2019-11-26 LAB
ERYTHROCYTE [DISTWIDTH] IN BLOOD BY AUTOMATED COUNT: 16.8 % (ref 11.5–14.5)
HCT VFR BLD AUTO: 26.4 % (ref 35–47)
HGB BLD-MCNC: 6.3 G/DL (ref 11.5–16)
HGB BLD-MCNC: 6.5 G/DL (ref 11.5–16)
HGB BLD-MCNC: 7.3 G/DL (ref 11.5–16)
HGB BLD-MCNC: 7.4 G/DL (ref 11.5–16)
HGB BLD-MCNC: 8.2 G/DL (ref 11.5–16)
HGB BLD-MCNC: 8.2 G/DL (ref 11.5–16)
MCH RBC QN AUTO: 25.5 PG (ref 26–34)
MCHC RBC AUTO-ENTMCNC: 31.1 G/DL (ref 30–36.5)
MCV RBC AUTO: 82 FL (ref 80–99)
NRBC # BLD: 0 K/UL (ref 0–0.01)
NRBC BLD-RTO: 0 PER 100 WBC
PLATELET # BLD AUTO: 421 K/UL (ref 150–400)
PMV BLD AUTO: 9.2 FL (ref 8.9–12.9)
RBC # BLD AUTO: 3.22 M/UL (ref 3.8–5.2)
WBC # BLD AUTO: 10.9 K/UL (ref 3.6–11)

## 2019-11-26 PROCEDURE — 77030040361 HC SLV COMPR DVT MDII -B: Performed by: OBSTETRICS & GYNECOLOGY

## 2019-11-26 PROCEDURE — 74011000250 HC RX REV CODE- 250: Performed by: NURSE ANESTHETIST, CERTIFIED REGISTERED

## 2019-11-26 PROCEDURE — 77030018778 HC MANIP UTER VCAR CNMD -B: Performed by: OBSTETRICS & GYNECOLOGY

## 2019-11-26 PROCEDURE — 74011250636 HC RX REV CODE- 250/636: Performed by: OBSTETRICS & GYNECOLOGY

## 2019-11-26 PROCEDURE — 77030008684 HC TU ET CUF COVD -B: Performed by: NURSE ANESTHETIST, CERTIFIED REGISTERED

## 2019-11-26 PROCEDURE — 85018 HEMOGLOBIN: CPT

## 2019-11-26 PROCEDURE — 51798 US URINE CAPACITY MEASURE: CPT

## 2019-11-26 PROCEDURE — 77010033678 HC OXYGEN DAILY

## 2019-11-26 PROCEDURE — 76010000876 HC OR TIME 2 TO 2.5HR INTENSV - TIER 2: Performed by: OBSTETRICS & GYNECOLOGY

## 2019-11-26 PROCEDURE — 77030013532 HC SEAL FBRN TISSL RDYTUSE 4ML BAXT -C: Performed by: OBSTETRICS & GYNECOLOGY

## 2019-11-26 PROCEDURE — 77030003578 HC NDL INSUF VERES AMR -B: Performed by: OBSTETRICS & GYNECOLOGY

## 2019-11-26 PROCEDURE — 77030002933 HC SUT MCRYL J&J -A: Performed by: OBSTETRICS & GYNECOLOGY

## 2019-11-26 PROCEDURE — P9016 RBC LEUKOCYTES REDUCED: HCPCS

## 2019-11-26 PROCEDURE — 77030013252 HC APPL DUPLOSPRY BAXT -B: Performed by: OBSTETRICS & GYNECOLOGY

## 2019-11-26 PROCEDURE — 77030026438 HC STYL ET INTUB CARD -A: Performed by: NURSE ANESTHETIST, CERTIFIED REGISTERED

## 2019-11-26 PROCEDURE — 36415 COLL VENOUS BLD VENIPUNCTURE: CPT

## 2019-11-26 PROCEDURE — 77030008756 HC TU IRR SUC STRY -B: Performed by: OBSTETRICS & GYNECOLOGY

## 2019-11-26 PROCEDURE — 74011250636 HC RX REV CODE- 250/636: Performed by: ANESTHESIOLOGY

## 2019-11-26 PROCEDURE — 77030016151 HC PROTCTR LNS DFOG COVD -B: Performed by: OBSTETRICS & GYNECOLOGY

## 2019-11-26 PROCEDURE — 74011250636 HC RX REV CODE- 250/636: Performed by: NURSE ANESTHETIST, CERTIFIED REGISTERED

## 2019-11-26 PROCEDURE — 77030040830 HC CATH URETH FOL MDII -A: Performed by: OBSTETRICS & GYNECOLOGY

## 2019-11-26 PROCEDURE — 77030027743 HC APPL F/HEMSTAT BARD -B: Performed by: OBSTETRICS & GYNECOLOGY

## 2019-11-26 PROCEDURE — 36430 TRANSFUSION BLD/BLD COMPNT: CPT

## 2019-11-26 PROCEDURE — 74011000250 HC RX REV CODE- 250: Performed by: OBSTETRICS & GYNECOLOGY

## 2019-11-26 PROCEDURE — 77030031492 HC PRT ACC BLNT AIRSEAL CNMD -B: Performed by: OBSTETRICS & GYNECOLOGY

## 2019-11-26 PROCEDURE — 77030039266 HC ADH SKN EXOFIN S2SG -A: Performed by: OBSTETRICS & GYNECOLOGY

## 2019-11-26 PROCEDURE — 77030020263 HC SOL INJ SOD CL0.9% LFCR 1000ML: Performed by: OBSTETRICS & GYNECOLOGY

## 2019-11-26 PROCEDURE — 77030040922 HC BLNKT HYPOTHRM STRY -A

## 2019-11-26 PROCEDURE — 85027 COMPLETE CBC AUTOMATED: CPT

## 2019-11-26 PROCEDURE — 77030011640 HC PAD GRND REM COVD -A: Performed by: OBSTETRICS & GYNECOLOGY

## 2019-11-26 PROCEDURE — 74011250637 HC RX REV CODE- 250/637: Performed by: OBSTETRICS & GYNECOLOGY

## 2019-11-26 PROCEDURE — 77030018836 HC SOL IRR NACL ICUM -A: Performed by: OBSTETRICS & GYNECOLOGY

## 2019-11-26 PROCEDURE — 88307 TISSUE EXAM BY PATHOLOGIST: CPT

## 2019-11-26 PROCEDURE — 76060000035 HC ANESTHESIA 2 TO 2.5 HR: Performed by: OBSTETRICS & GYNECOLOGY

## 2019-11-26 PROCEDURE — 99218 HC RM OBSERVATION: CPT

## 2019-11-26 PROCEDURE — 77030035236 HC SUT PDS STRATFX BARB J&J -B: Performed by: OBSTETRICS & GYNECOLOGY

## 2019-11-26 PROCEDURE — 76210000016 HC OR PH I REC 1 TO 1.5 HR: Performed by: OBSTETRICS & GYNECOLOGY

## 2019-11-26 PROCEDURE — 77030032060 HC PWDR HEMSTAT ARISTA ASRB 3GM BARD -C: Performed by: OBSTETRICS & GYNECOLOGY

## 2019-11-26 PROCEDURE — 77030035277 HC OBTRTR BLDELSS DISP INTU -B: Performed by: OBSTETRICS & GYNECOLOGY

## 2019-11-26 RX ORDER — SODIUM CHLORIDE, SODIUM LACTATE, POTASSIUM CHLORIDE, CALCIUM CHLORIDE 600; 310; 30; 20 MG/100ML; MG/100ML; MG/100ML; MG/100ML
75 INJECTION, SOLUTION INTRAVENOUS CONTINUOUS
Status: DISCONTINUED | OUTPATIENT
Start: 2019-11-26 | End: 2019-11-26 | Stop reason: HOSPADM

## 2019-11-26 RX ORDER — KETOROLAC TROMETHAMINE 30 MG/ML
30 INJECTION, SOLUTION INTRAMUSCULAR; INTRAVENOUS EVERY 6 HOURS
Status: DISCONTINUED | OUTPATIENT
Start: 2019-11-26 | End: 2019-11-27 | Stop reason: HOSPADM

## 2019-11-26 RX ORDER — LIDOCAINE HYDROCHLORIDE 10 MG/ML
0.1 INJECTION, SOLUTION EPIDURAL; INFILTRATION; INTRACAUDAL; PERINEURAL AS NEEDED
Status: DISCONTINUED | OUTPATIENT
Start: 2019-11-26 | End: 2019-11-26 | Stop reason: HOSPADM

## 2019-11-26 RX ORDER — GLYCOPYRROLATE 0.2 MG/ML
INJECTION INTRAMUSCULAR; INTRAVENOUS AS NEEDED
Status: DISCONTINUED | OUTPATIENT
Start: 2019-11-26 | End: 2019-11-26 | Stop reason: HOSPADM

## 2019-11-26 RX ORDER — SODIUM CHLORIDE 9 MG/ML
50 INJECTION, SOLUTION INTRAVENOUS CONTINUOUS
Status: DISCONTINUED | OUTPATIENT
Start: 2019-11-26 | End: 2019-11-26 | Stop reason: HOSPADM

## 2019-11-26 RX ORDER — NALOXONE HYDROCHLORIDE 0.4 MG/ML
0.4 INJECTION, SOLUTION INTRAMUSCULAR; INTRAVENOUS; SUBCUTANEOUS AS NEEDED
Status: DISCONTINUED | OUTPATIENT
Start: 2019-11-26 | End: 2019-11-27 | Stop reason: HOSPADM

## 2019-11-26 RX ORDER — OXYCODONE AND ACETAMINOPHEN 5; 325 MG/1; MG/1
1 TABLET ORAL AS NEEDED
Status: DISCONTINUED | OUTPATIENT
Start: 2019-11-26 | End: 2019-11-26 | Stop reason: HOSPADM

## 2019-11-26 RX ORDER — DIPHENHYDRAMINE HCL 25 MG
25 CAPSULE ORAL
Status: DISCONTINUED | OUTPATIENT
Start: 2019-11-26 | End: 2019-11-27 | Stop reason: HOSPADM

## 2019-11-26 RX ORDER — SODIUM CHLORIDE 0.9 % (FLUSH) 0.9 %
5-40 SYRINGE (ML) INJECTION AS NEEDED
Status: DISCONTINUED | OUTPATIENT
Start: 2019-11-26 | End: 2019-11-26 | Stop reason: HOSPADM

## 2019-11-26 RX ORDER — NEOSTIGMINE METHYLSULFATE 1 MG/ML
INJECTION INTRAVENOUS AS NEEDED
Status: DISCONTINUED | OUTPATIENT
Start: 2019-11-26 | End: 2019-11-26 | Stop reason: HOSPADM

## 2019-11-26 RX ORDER — MIDAZOLAM HYDROCHLORIDE 1 MG/ML
1 INJECTION, SOLUTION INTRAMUSCULAR; INTRAVENOUS AS NEEDED
Status: DISCONTINUED | OUTPATIENT
Start: 2019-11-26 | End: 2019-11-26 | Stop reason: HOSPADM

## 2019-11-26 RX ORDER — MORPHINE SULFATE 10 MG/ML
2 INJECTION, SOLUTION INTRAMUSCULAR; INTRAVENOUS
Status: DISCONTINUED | OUTPATIENT
Start: 2019-11-26 | End: 2019-11-26 | Stop reason: HOSPADM

## 2019-11-26 RX ORDER — LIDOCAINE HYDROCHLORIDE 20 MG/ML
INJECTION, SOLUTION EPIDURAL; INFILTRATION; INTRACAUDAL; PERINEURAL AS NEEDED
Status: DISCONTINUED | OUTPATIENT
Start: 2019-11-26 | End: 2019-11-26 | Stop reason: HOSPADM

## 2019-11-26 RX ORDER — ALBUTEROL SULFATE 0.83 MG/ML
2.5 SOLUTION RESPIRATORY (INHALATION)
Status: DISCONTINUED | OUTPATIENT
Start: 2019-11-26 | End: 2019-11-27 | Stop reason: HOSPADM

## 2019-11-26 RX ORDER — MORPHINE SULFATE 2 MG/ML
INJECTION, SOLUTION INTRAMUSCULAR; INTRAVENOUS
Status: DISPENSED
Start: 2019-11-26 | End: 2019-11-27

## 2019-11-26 RX ORDER — DOCUSATE SODIUM 100 MG/1
100 CAPSULE, LIQUID FILLED ORAL 2 TIMES DAILY
Status: DISCONTINUED | OUTPATIENT
Start: 2019-11-26 | End: 2019-11-27 | Stop reason: HOSPADM

## 2019-11-26 RX ORDER — DIPHENHYDRAMINE HYDROCHLORIDE 50 MG/ML
12.5 INJECTION, SOLUTION INTRAMUSCULAR; INTRAVENOUS AS NEEDED
Status: DISCONTINUED | OUTPATIENT
Start: 2019-11-26 | End: 2019-11-26 | Stop reason: HOSPADM

## 2019-11-26 RX ORDER — SODIUM CHLORIDE 0.9 % (FLUSH) 0.9 %
5-40 SYRINGE (ML) INJECTION EVERY 8 HOURS
Status: DISCONTINUED | OUTPATIENT
Start: 2019-11-26 | End: 2019-11-26 | Stop reason: HOSPADM

## 2019-11-26 RX ORDER — HYDROMORPHONE HYDROCHLORIDE 1 MG/ML
0.2 INJECTION, SOLUTION INTRAMUSCULAR; INTRAVENOUS; SUBCUTANEOUS
Status: DISCONTINUED | OUTPATIENT
Start: 2019-11-26 | End: 2019-11-26 | Stop reason: HOSPADM

## 2019-11-26 RX ORDER — BUPIVACAINE HYDROCHLORIDE 5 MG/ML
INJECTION, SOLUTION EPIDURAL; INTRACAUDAL AS NEEDED
Status: DISCONTINUED | OUTPATIENT
Start: 2019-11-26 | End: 2019-11-26 | Stop reason: HOSPADM

## 2019-11-26 RX ORDER — ONDANSETRON 2 MG/ML
4 INJECTION INTRAMUSCULAR; INTRAVENOUS AS NEEDED
Status: DISCONTINUED | OUTPATIENT
Start: 2019-11-26 | End: 2019-11-26 | Stop reason: HOSPADM

## 2019-11-26 RX ORDER — SODIUM CHLORIDE 9 MG/ML
INJECTION, SOLUTION INTRAVENOUS
Status: DISCONTINUED | OUTPATIENT
Start: 2019-11-26 | End: 2019-11-26 | Stop reason: HOSPADM

## 2019-11-26 RX ORDER — ROCURONIUM BROMIDE 10 MG/ML
INJECTION, SOLUTION INTRAVENOUS AS NEEDED
Status: DISCONTINUED | OUTPATIENT
Start: 2019-11-26 | End: 2019-11-26 | Stop reason: HOSPADM

## 2019-11-26 RX ORDER — ONDANSETRON 2 MG/ML
INJECTION INTRAMUSCULAR; INTRAVENOUS AS NEEDED
Status: DISCONTINUED | OUTPATIENT
Start: 2019-11-26 | End: 2019-11-26 | Stop reason: HOSPADM

## 2019-11-26 RX ORDER — MORPHINE SULFATE 2 MG/ML
2 INJECTION, SOLUTION INTRAMUSCULAR; INTRAVENOUS
Status: DISCONTINUED | OUTPATIENT
Start: 2019-11-26 | End: 2019-11-27 | Stop reason: HOSPADM

## 2019-11-26 RX ORDER — OXYCODONE AND ACETAMINOPHEN 5; 325 MG/1; MG/1
2 TABLET ORAL
Status: DISCONTINUED | OUTPATIENT
Start: 2019-11-26 | End: 2019-11-27 | Stop reason: HOSPADM

## 2019-11-26 RX ORDER — MIDAZOLAM HYDROCHLORIDE 1 MG/ML
0.5 INJECTION, SOLUTION INTRAMUSCULAR; INTRAVENOUS
Status: DISCONTINUED | OUTPATIENT
Start: 2019-11-26 | End: 2019-11-26 | Stop reason: HOSPADM

## 2019-11-26 RX ORDER — OXYCODONE AND ACETAMINOPHEN 5; 325 MG/1; MG/1
1 TABLET ORAL
Status: DISCONTINUED | OUTPATIENT
Start: 2019-11-26 | End: 2019-11-27 | Stop reason: HOSPADM

## 2019-11-26 RX ORDER — FENTANYL CITRATE 50 UG/ML
25 INJECTION, SOLUTION INTRAMUSCULAR; INTRAVENOUS
Status: DISCONTINUED | OUTPATIENT
Start: 2019-11-26 | End: 2019-11-26 | Stop reason: HOSPADM

## 2019-11-26 RX ORDER — ALBUTEROL SULFATE 90 UG/1
1 AEROSOL, METERED RESPIRATORY (INHALATION)
Status: DISCONTINUED | OUTPATIENT
Start: 2019-11-26 | End: 2019-11-26 | Stop reason: CLARIF

## 2019-11-26 RX ORDER — SUCCINYLCHOLINE CHLORIDE 20 MG/ML
INJECTION INTRAMUSCULAR; INTRAVENOUS AS NEEDED
Status: DISCONTINUED | OUTPATIENT
Start: 2019-11-26 | End: 2019-11-26 | Stop reason: HOSPADM

## 2019-11-26 RX ORDER — CEFAZOLIN SODIUM/WATER 2 G/20 ML
2 SYRINGE (ML) INTRAVENOUS ONCE
Status: COMPLETED | OUTPATIENT
Start: 2019-11-26 | End: 2019-11-26

## 2019-11-26 RX ORDER — FENTANYL CITRATE 50 UG/ML
50 INJECTION, SOLUTION INTRAMUSCULAR; INTRAVENOUS AS NEEDED
Status: DISCONTINUED | OUTPATIENT
Start: 2019-11-26 | End: 2019-11-26 | Stop reason: HOSPADM

## 2019-11-26 RX ORDER — SODIUM CHLORIDE, SODIUM LACTATE, POTASSIUM CHLORIDE, CALCIUM CHLORIDE 600; 310; 30; 20 MG/100ML; MG/100ML; MG/100ML; MG/100ML
INJECTION, SOLUTION INTRAVENOUS
Status: DISCONTINUED | OUTPATIENT
Start: 2019-11-26 | End: 2019-11-26 | Stop reason: HOSPADM

## 2019-11-26 RX ORDER — SODIUM CHLORIDE 0.9 % (FLUSH) 0.9 %
5-40 SYRINGE (ML) INJECTION AS NEEDED
Status: DISCONTINUED | OUTPATIENT
Start: 2019-11-26 | End: 2019-11-27 | Stop reason: HOSPADM

## 2019-11-26 RX ORDER — SODIUM CHLORIDE 0.9 % (FLUSH) 0.9 %
5-40 SYRINGE (ML) INJECTION EVERY 8 HOURS
Status: DISCONTINUED | OUTPATIENT
Start: 2019-11-26 | End: 2019-11-27 | Stop reason: HOSPADM

## 2019-11-26 RX ORDER — LANOLIN ALCOHOL/MO/W.PET/CERES
1 CREAM (GRAM) TOPICAL
Status: DISCONTINUED | OUTPATIENT
Start: 2019-11-27 | End: 2019-11-27 | Stop reason: HOSPADM

## 2019-11-26 RX ORDER — DEXAMETHASONE SODIUM PHOSPHATE 4 MG/ML
INJECTION, SOLUTION INTRA-ARTICULAR; INTRALESIONAL; INTRAMUSCULAR; INTRAVENOUS; SOFT TISSUE AS NEEDED
Status: DISCONTINUED | OUTPATIENT
Start: 2019-11-26 | End: 2019-11-26 | Stop reason: HOSPADM

## 2019-11-26 RX ORDER — SODIUM CHLORIDE 9 MG/ML
250 INJECTION, SOLUTION INTRAVENOUS AS NEEDED
Status: DISCONTINUED | OUTPATIENT
Start: 2019-11-26 | End: 2019-11-27 | Stop reason: HOSPADM

## 2019-11-26 RX ORDER — PROPOFOL 10 MG/ML
INJECTION, EMULSION INTRAVENOUS AS NEEDED
Status: DISCONTINUED | OUTPATIENT
Start: 2019-11-26 | End: 2019-11-26 | Stop reason: HOSPADM

## 2019-11-26 RX ORDER — MIDAZOLAM HYDROCHLORIDE 1 MG/ML
INJECTION, SOLUTION INTRAMUSCULAR; INTRAVENOUS AS NEEDED
Status: DISCONTINUED | OUTPATIENT
Start: 2019-11-26 | End: 2019-11-26 | Stop reason: HOSPADM

## 2019-11-26 RX ORDER — ACETAMINOPHEN 325 MG/1
650 TABLET ORAL
Status: DISCONTINUED | OUTPATIENT
Start: 2019-11-26 | End: 2019-11-26 | Stop reason: SDUPTHER

## 2019-11-26 RX ORDER — ONDANSETRON 2 MG/ML
4 INJECTION INTRAMUSCULAR; INTRAVENOUS
Status: DISCONTINUED | OUTPATIENT
Start: 2019-11-26 | End: 2019-11-27 | Stop reason: HOSPADM

## 2019-11-26 RX ORDER — FENTANYL CITRATE 50 UG/ML
INJECTION, SOLUTION INTRAMUSCULAR; INTRAVENOUS AS NEEDED
Status: DISCONTINUED | OUTPATIENT
Start: 2019-11-26 | End: 2019-11-26 | Stop reason: HOSPADM

## 2019-11-26 RX ADMIN — MORPHINE SULFATE 2 MG: 2 INJECTION, SOLUTION INTRAMUSCULAR; INTRAVENOUS at 17:11

## 2019-11-26 RX ADMIN — NEOSTIGMINE METHYLSULFATE 3 MG: 1 INJECTION, SOLUTION INTRAMUSCULAR; INTRAVENOUS; SUBCUTANEOUS at 14:27

## 2019-11-26 RX ADMIN — MIDAZOLAM 0.5 MG: 1 INJECTION INTRAMUSCULAR; INTRAVENOUS at 15:58

## 2019-11-26 RX ADMIN — ROCURONIUM BROMIDE 20 MG: 10 SOLUTION INTRAVENOUS at 13:18

## 2019-11-26 RX ADMIN — LIDOCAINE HYDROCHLORIDE 100 MG: 20 INJECTION, SOLUTION EPIDURAL; INFILTRATION; INTRACAUDAL; PERINEURAL at 12:49

## 2019-11-26 RX ADMIN — Medication 10 ML: at 16:01

## 2019-11-26 RX ADMIN — ONDANSETRON HYDROCHLORIDE 4 MG: 2 INJECTION, SOLUTION INTRAMUSCULAR; INTRAVENOUS at 14:29

## 2019-11-26 RX ADMIN — ROCURONIUM BROMIDE 5 MG: 10 SOLUTION INTRAVENOUS at 12:49

## 2019-11-26 RX ADMIN — KETOROLAC TROMETHAMINE 30 MG: 30 INJECTION, SOLUTION INTRAMUSCULAR at 18:03

## 2019-11-26 RX ADMIN — GLYCOPYRROLATE 0.4 MG: 0.2 INJECTION, SOLUTION INTRAMUSCULAR; INTRAVENOUS at 14:27

## 2019-11-26 RX ADMIN — ONDANSETRON 4 MG: 2 INJECTION INTRAMUSCULAR; INTRAVENOUS at 17:11

## 2019-11-26 RX ADMIN — Medication 2 G: at 13:04

## 2019-11-26 RX ADMIN — FENTANYL CITRATE 100 MCG: 50 INJECTION, SOLUTION INTRAMUSCULAR; INTRAVENOUS at 13:37

## 2019-11-26 RX ADMIN — Medication 10 ML: at 08:00

## 2019-11-26 RX ADMIN — MIDAZOLAM 1 MG: 1 INJECTION INTRAMUSCULAR; INTRAVENOUS at 12:40

## 2019-11-26 RX ADMIN — ACETAMINOPHEN 650 MG: 325 TABLET, FILM COATED ORAL at 21:07

## 2019-11-26 RX ADMIN — FENTANYL CITRATE 50 MCG: 50 INJECTION, SOLUTION INTRAMUSCULAR; INTRAVENOUS at 13:18

## 2019-11-26 RX ADMIN — SODIUM CHLORIDE, POTASSIUM CHLORIDE, SODIUM LACTATE AND CALCIUM CHLORIDE: 600; 310; 30; 20 INJECTION, SOLUTION INTRAVENOUS at 12:40

## 2019-11-26 RX ADMIN — DEXAMETHASONE SODIUM PHOSPHATE 4 MG: 4 INJECTION, SOLUTION INTRAMUSCULAR; INTRAVENOUS at 14:30

## 2019-11-26 RX ADMIN — ROCURONIUM BROMIDE 10 MG: 10 SOLUTION INTRAVENOUS at 13:38

## 2019-11-26 RX ADMIN — SODIUM CHLORIDE, SODIUM LACTATE, POTASSIUM CHLORIDE, AND CALCIUM CHLORIDE 75 ML/HR: 600; 310; 30; 20 INJECTION, SOLUTION INTRAVENOUS at 11:56

## 2019-11-26 RX ADMIN — ACETAMINOPHEN 650 MG: 325 TABLET, FILM COATED ORAL at 09:23

## 2019-11-26 RX ADMIN — SODIUM CHLORIDE: 900 INJECTION, SOLUTION INTRAVENOUS at 14:04

## 2019-11-26 RX ADMIN — SUCCINYLCHOLINE CHLORIDE 120 MG: 20 INJECTION, SOLUTION INTRAMUSCULAR; INTRAVENOUS at 12:49

## 2019-11-26 RX ADMIN — HYDROMORPHONE HYDROCHLORIDE 0.2 MG: 1 INJECTION, SOLUTION INTRAMUSCULAR; INTRAVENOUS; SUBCUTANEOUS at 15:49

## 2019-11-26 RX ADMIN — Medication 10 ML: at 22:48

## 2019-11-26 RX ADMIN — MIDAZOLAM 0.5 MG: 1 INJECTION INTRAMUSCULAR; INTRAVENOUS at 15:49

## 2019-11-26 RX ADMIN — PHENYLEPHRINE HYDROCHLORIDE 30 MCG/MIN: 10 INJECTION INTRAVENOUS at 13:03

## 2019-11-26 RX ADMIN — DOCUSATE SODIUM 100 MG: 100 CAPSULE, LIQUID FILLED ORAL at 18:03

## 2019-11-26 RX ADMIN — Medication 10 ML: at 17:19

## 2019-11-26 RX ADMIN — PROPOFOL 100 MG: 10 INJECTION, EMULSION INTRAVENOUS at 12:49

## 2019-11-26 RX ADMIN — SODIUM CHLORIDE: 900 INJECTION, SOLUTION INTRAVENOUS at 12:40

## 2019-11-26 RX ADMIN — DIPHENHYDRAMINE HYDROCHLORIDE 25 MG: 25 CAPSULE ORAL at 09:23

## 2019-11-26 RX ADMIN — ROCURONIUM BROMIDE 15 MG: 10 SOLUTION INTRAVENOUS at 12:58

## 2019-11-26 RX ADMIN — PROPOFOL 100 MG: 10 INJECTION, EMULSION INTRAVENOUS at 13:18

## 2019-11-26 RX ADMIN — FENTANYL CITRATE 100 MCG: 50 INJECTION, SOLUTION INTRAMUSCULAR; INTRAVENOUS at 12:49

## 2019-11-26 RX ADMIN — Medication 10 ML: at 16:00

## 2019-11-26 RX ADMIN — HYDROMORPHONE HYDROCHLORIDE 0.2 MG: 1 INJECTION, SOLUTION INTRAMUSCULAR; INTRAVENOUS; SUBCUTANEOUS at 16:00

## 2019-11-26 NOTE — ANESTHESIA POSTPROCEDURE EVALUATION
Post-Anesthesia Evaluation and Assessment    Patient: Kevin Melendez MRN: [id number]  SSN: [id number]    YOB: 1970  Age: 54 y.o. Sex: female      I have evaluated the patient and they are stable and ready for discharge from the PACU. Cardiovascular Function/Vital Signs  Visit Vitals  /81 (BP 1 Location: Left arm, BP Patient Position: At rest)   Pulse 79   Temp 37.3 °C (99.2 °F)   Resp 20   SpO2 100%   Breastfeeding No       Patient is status post General anesthesia for Procedure(s):  robotic assisted total laparoscopic hysterectomy bilateral salpingectomy, right oophorectomy. Nausea/Vomiting: None    Postoperative hydration reviewed and adequate. Pain:  Pain Scale 1: Numeric (0 - 10) (11/26/19 1204)  Pain Intensity 1: 0 (11/26/19 1204)   Managed    Neurological Status: At baseline    Mental Status, Level of Consciousness: Alert and  oriented to person, place, and time    Pulmonary Status:   O2 Device: Room air (11/26/19 1223)   Adequate oxygenation and airway patent    Complications related to anesthesia: None    Post-anesthesia assessment completed. No concerns    Signed By: Janet Delcid MD     November 26, 2019              Procedure(s):  robotic assisted total laparoscopic hysterectomy bilateral salpingectomy, right oophorectomy. general    <BSHSIANPOST>    No vitals data found for the desired time range.

## 2019-11-26 NOTE — PROGRESS NOTES
TRANSFER - IN REPORT:    Verbal report received from direct admit(name) on Alexander Duron  being received from home(unit) for routine progression of care      Report consisted of patients Situation, Background, Assessment and   Recommendations(SBAR). Information from the following report(s) SBAR, Kardex, Procedure Summary, Intake/Output and MAR was reviewed with the receiving nurse. Opportunity for questions and clarification was provided. Assessment completed upon patients arrival to unit and care assumed.

## 2019-11-26 NOTE — PERIOP NOTES
TISSEEL 10 ML WAS GIVEN TO THE STERILE FIELD TO BE USED BY MD DURING PROCEDURE  LOT: [id number]  EXP: 06/30/2021

## 2019-11-26 NOTE — H&P
Gynecology History and Physical    Name: Praveen Masters MRN: [id number] SSN: [id number]    YOB: 1970  Age: 54 y.o. Sex: female       Subjective:      Chief complaint:  Menorrhagia to anemia    Brianna Cage is a 54 y.o.  female with a history of menorrhagia to anemia refractory to conservative treatment. She is admitted for blood transfusion prior to planned Procedure(s) (LRB):  robotic assisted total laparoscopic hysterectomy bilateral salpingectomy (N/A). Later today    Patient with Hgb of 5.8 at preop appointment. Endometrial biopsy showed endometrial polyp. Patient offered D&C but desires definitive management. From prior notes:  Initially started on PO provera, but bled through this. Then started DepoProvera on 9/26/19. She has continued bleeding on this. Given supplemental PO provera earlier this week. 9/26/19 -   Still had period with provera, and stayed on for 2 weeks.     Ultrasound showed:      FINDINGS: Transabdominal ultrasound:  Uterus: measures 9.5 x 6.2 x 7.0 cm. There are multiple uterine fibroids  including representative fibroids that measure 3.1 cm on the left, 3.0 cm in the  midline, and 3.2 cm on the right.     Endometrium: 6 mm     Right ovary: 1.5 x 1.3 x 2.2 cm. Blood flow is present in the right ovary. No  adnexal mass or cyst.     Left ovary: 2.8 x 1.8 x 1.2 cm. Blood flow is present in the left ovary. No  adnexal mass or cyst.     Endovaginal ultrasound:   Uterus: measures 10.5 x 6.0 x 4.3 cm. There are multiple uterine fibroids  including representative fibroids that measure 2.7 cm on the left, 2.5 cm in the  midline, and 3.3 cm on the right.     Endometrium: Not seen due to uterine fibroids.     The ovaries are not seen due to bowel gas.     IMPRESSION:   Multiple uterine fibroids measuring up to 3.3 cm.  Otherwise, normal appearance  of the uterus and ovaries.     9/4/19  Patient's periods have always been heavy.     Regular periods around the same time each month.  Very heavy.  Last about 7 days.  At times will restart bleeding about a week later. Nella Hobson has to use 2-3 pads, and often bleeds through pads.  Lots of large clots.  Periods are painful.  Pain not only with clots.     Her current method of family planning is tubal ligation.     Patient anemic.  Patient is currently on iron supplementation by PCP.     Associated symptoms include none.     Alleviating factors: none     Aggravating factors: none       The patient is sexually active.     Last Pap smear: about 2 years per pt report.  No hx of abnormal pap smears.     Normal thyroid labs at PCP.     PSH: BTL  POB: , SAB x1,  x3, tested to 8lb 13oz  PGYN: Hx of Trich in the past.  No abnormal paps.         OB History    No obstetric history on file. Past Medical History:   Diagnosis Date    Asthma     Heavy menses      Past Surgical History:   Procedure Laterality Date    RAMAKRISHNA US BX BREAST RT 1ST LESION W/CLIP AND SPECIMEN Right     Neg     Social History     Occupational History    Not on file   Tobacco Use    Smoking status: Never Smoker    Smokeless tobacco: Never Used   Substance and Sexual Activity    Alcohol use: Not Currently    Drug use: No    Sexual activity: Yes     Partners: Male     Family History   Problem Relation Age of Onset    Hypertension Mother     Asthma Father     Asthma Maternal Grandmother     Cancer Maternal Grandmother         ? TYPE CA    Diabetes Neg Hx     Heart Disease Neg Hx         No Known Allergies  Prior to Admission medications    Medication Sig Start Date End Date Taking? Authorizing Provider   albuterol (PROVENTIL HFA, VENTOLIN HFA, PROAIR HFA) 90 mcg/actuation inhaler Take 1 Puff by inhalation every four (4) hours as needed for Wheezing. Provider, Historical   medroxyPROGESTERone (PROVERA) 10 mg tablet Take 1 Tab by mouth daily.  19   Hermon Goodell, MD   medroxyPROGESTERone (DEPO-PROVERA) 150 mg/mL injection 1 mL by IntraMUSCular route every three (3) months. 9/26/19   Daly Thao MD   ibuprofen (MOTRIN) 600 mg tablet Take 1 Tab by mouth every six (6) hours as needed for Pain. 1/8/17   Devorah Acosta NP        Review of Systems:  A comprehensive review of systems was negative except for that written in the History of Present Illness. Objective: There were no vitals filed for this visit. Physical Exam:  Patient without distress. Heart: Regular rate and rhythm  Lung: normal respiratory effort  Abdomen: soft, nontender  GYN: deferred    Assessment:     Active Problems:    Menorrhagia (11/26/2019)      Anemia (11/26/2019)       54 y.o. with menorrhagia to anemia    Plan:   - plan for transfusion of 2U PRBCs. Plan to get at least 1 Unit in prior to surgery and second intraoperatively. Procedure(s) (LRB):  robotic assisted total laparoscopic hysterectomy bilateral salpingectomy (N/A)  Discussed the risks of surgery including the risks of bleeding, infection, deep vein thrombosis, and surgical injuries to internal organs including but not limited to the bowels, bladder, rectum, and female reproductive organs. The patient understands the risks; any and all questions were answered to the patient's satisfaction. - will keep patient overnight postoperatively for observation.

## 2019-11-26 NOTE — ROUTINE PROCESS
Patient: Ragini Daley MRN: [id number]  SSN: [id number]   YOB: 1970  Age: 54 y.o. Sex: female     Patient is status post Procedure(s):  robotic assisted total laparoscopic hysterectomy bilateral salpingectomy, right oophorectomy. Surgeon(s) and Role:     Abigail Lizama MD - Primary    Local/Dose/Irrigation:  Marcaine 0.5% - 30 ml                  Peripheral IV 11/26/19 Right Antecubital (Active)   Site Assessment Clean, dry, & intact 11/26/2019  9:24 AM   Phlebitis Assessment 0 11/26/2019  9:24 AM   Infiltration Assessment 0 11/26/2019  9:24 AM   Dressing Status Clean, dry, & intact 11/26/2019  9:24 AM   Dressing Type Transparent;Tape 11/26/2019  9:24 AM   Hub Color/Line Status End cap changed; Flushed;Patent; Infusing 11/26/2019  9:24 AM   Action Taken Open ports on tubing capped 11/26/2019  9:24 AM   Alcohol Cap Used Yes 11/26/2019  9:24 AM       Peripheral IV 11/26/19 Left Hand (Active)            Airway - Endotracheal Tube 11/26/19 (Active)                   Dressing/Packing:  Wound Abdomen Trocar sites x 4-Dressing Type: Topical skin adhesive/glue (11/26/19 1424)  Wound Vagina-Dressing Type: Yarelis-pad (11/26/19 1424)    Splint/Cast:  ]    Other:

## 2019-11-26 NOTE — PERIOP NOTES
TRANSFER - OUT REPORT:    Verbal report given to [address] Melchor(name) on Kevin Melendez  being transferred to 307(unit) for routine post - op       Report consisted of patients Situation, Background, Assessment and   Recommendations(SBAR). Time Pre op antibiotic given:1304  Anesthesia Stop time: 5757  Galdamez Present on Transfer to floor:y  Order for Galdamez on Chart:y  Discharge Prescriptions with Chart:n    Information from the following report(s) SBAR, OR Summary, Intake/Output, MAR and Cardiac Rhythm NSR was reviewed with the receiving nurse. Opportunity for questions and clarification was provided. Is the patient on 02? YES       L/Min 2       Other     Is the patient on a monitor? NO    Is the nurse transporting with the patient? NO    Surgical Waiting Area notified of patient's transfer from PACU?  YES, no one waiting      The following personal items collected during your admission accompanied patient upon transfer:   Dental Appliance: Dental Appliances: None  Vision:   Hearing Aid:    Jewelry: Jewelry: None  Clothing: Clothing: (panties in plastic bag on bed in pacu)  Other Valuables:   Valuables sent to safe:

## 2019-11-26 NOTE — PROGRESS NOTES
Bedside and Verbal shift change report given to [address] Jay[address] Garcia (oncoming nurse) by Cecille Moody (offgoing nurse). Report included the following information SBAR, Kardex, Procedure Summary, Intake/Output and MAR.     0830- assessment complete, pre op check list complete.   3592- unit one of PRBs started

## 2019-11-26 NOTE — PROGRESS NOTES
Called pt advised of results pt was appreciative and verbalized understanding pt would like to proceed with surgery as scheduled.

## 2019-11-26 NOTE — OP NOTES
Preoperative diagnosis: menorrhagia to anemia, fibroids  Postoperative diagnosis: same    Procedure:  Robotic assisted total laparoscopic hysterectomy and bilateral salpingectomy and right oophorectomy    Anesthesia: General    Assistant surgeon:  None    Implants: none    Complications: none    Estimated blood loss: 50 ml  IV fluid: 1200 ml, and 2U PRBCs (first unit completed preoperatively, 2nd unit started preoperatively)  Urine output: 400 ml    Specimen: uterus, cervix, bilateral fallopian tubes, right ovary    Findings: On exam under anesthesia, enlarged globular uterus. On laparoscopy, normal diaphragm, liver, stomach, mesentery, bowel, and omentum. Mildly dilated fallopian tubes bilaterally. Normal left ovary. Right ovary with multiple hemorrhagic appearing cysts. Uterus enlarged with multiple large fibroids including 5 cm anterior fibroid. There was minimal distortion of the bilateral adnexa and pelvic sidewalls by fibroids. Ureters were noted and traced bilaterally with incisions distant from ureters. Procedure:    Patient was taken to the operating room and placed on the operating room table. She was then placed under general anesthesia. She was placed in lithotomy position, with her legs in Yellofin stirrups and her arms tucked in the usual robotic fashion. An exam under anesthesia was performed with the above findings. She was then prepped and draped in a sterile fashion. A time out was performed. Attention was turned to the patient's vagina, where a sterile speculum was inserted and the cervix was visualized. The cervix was then grasped on the anterior lip with a single tooth tenaculum. Uterus was then sounded to 12 cm. A V-care uterine manipulator was placed without difficulty. Tenaculum and speculum were removed from the patient's vagina. A culp catheter was placed in the bladder.     Attention was then turned to the patient's abdomen where 30 ml of 0.5% plain marcaine were injected in areas of planned incisions. A 1 cm infraumbilical incision was made using a knife. Veress needle was then placed intraabdominally. Water droplet test passed. Opening pressure of 8 mm Hg. The abdomen was then insufflated with carbon dioxide gas to 15mm Hg. Veress needle was removed and replaced with an 8mm port. Camera was inserted into the abdomen with the above findings. Next 3 additional ports, one 8mm on the patient's right, one 8mm on the patient's left, and one 8 mm assistant port on the patient's right were placed under direct visualization. Camera was then removed from the patient's abdomen. Patient was placed in as steep Trendelenberg as possible. Robot was then docked in the usual fashion. Camera was reinserted into the abdomen. Monopolar scissors were then inserted under direct visualization through the right instrument port. Bipolar fenestrated grasper was then inserted under direct visualization through the left instrument port. Attention was then turned to the left adnexa where the infundibulopelvic ligament was identified. The left fallopian tube was then identified and amputated along the misosalpinx to the level of the uteroovarian ligament. This was then doubly cauterized and cut. The incision was then continued to the round ligament, which was then doubly cauterized and cut. The incision was then continued into the left broad ligament. Of note, ureter was easily identified bilaterally and care was made to stay far from course. Also of note, bilateral broad ligaments were particularly thin and wide. Attention was then turned to the right adnexa where the infundibulopelvic ligament was identified. Since the right ovary appeared abnormal, it was decided to remove it. The infundibulopelvic ligament was the doubly clamped and cut. the incision was then continued along the right misoovarium to the round ligament. The round ligament was doubly cauterized and cut. The incision was then continued into the rightt broad ligament. Attention was then turned to the anterior of the uterus, where the vesicouterine peritoneum was identified. This was then elevated and incised in the midline using monopolar scissors. The incision was then extended laterally bilaterally along the anterior leave of the broad ligament to the prior incisions. A bladder flap was then developed using monopolar scissors and blunt dissection. Attention was then turned to the left adnexa, where the remainder of the broad ligament was taken down to allow for skeletonization of the uterine vessels. The vessels were then doubly cauterized and cut. Attention was then turned to the right adnexa, where the remainder of the broad ligament was taken down to allow for skeletonization of the uterine vessels. The vessels were then doubly cauterized and cut. The anterior colpotomy was then made along the cup of the manipulator. The colpotomy was continued to above the level of the uterine vessels bilaterally. The colpotomy was continued through the level of the right uterine vessels, right uterosacral ligament, posterior colpotomy, left uterosacral ligament, and finally to the level of the left uterine vessels. This resulted in complete amputation of the uterus. The right adnexa was then amputated and removed through the vagina. An attempt was made to remove the intact uterus through the vagina, which failed. The 5 cm anterior fibroid was then amputated, allowing the uterus to be removed through the vagina. The fibroid was then removed through the vagina. The vaginal cuff was then inspected, and hemostasis achieved using monopolar scissors. The monopolar scissors were then removed from the right instrument port and replaced with a needle . A 2-0 V-Lock suture was then inserted into the abdomen through the assistant port. This was used to close the vaginal cuff in a running fashion. Care was made to incorporate the uterosacral ligaments into this closure. This resulted in good hemostasis, reapproximation, and elevation of the vaginal cuff. Excess suture and needle were then trimmed and removed from the abdomen through assistant port. The pelvis was then copiously irrigated and cleared of all clots and debris. All pedicles were inspected and found to be hemostatic. The cuff was then covered in Tisseel and the remainder of the pedicles were coated with Sandhya. All instruments were then removed from the abdominal cavity. The robot was undocked in the usual fashion. All ports were removed from the abdominal wall, and the pneumoperitoneum was allowed to escape. The skin was closed with 4-0 Monocryl in a subcuticular fashion and Dermabond. The patient was taken out of Trendelenberg. The culp was allowed to remain in the bladder. Patient was taken out of lithotomy position, taken out of anesthesia, and taken to the recovery room in stable condition. She tolerated the procedure well, and there were no complications.

## 2019-11-26 NOTE — INTERVAL H&P NOTE
H&P Update: Juventino Bustos was seen and examined. History and physical has been reviewed.  Significant clinical changes have occurred as noted:  Patient received first unit of blood

## 2019-11-26 NOTE — ANESTHESIA PREPROCEDURE EVALUATION
Relevant Problems   No relevant active problems       Anesthetic History   No history of anesthetic complications            Review of Systems / Medical History  Patient summary reviewed, nursing notes reviewed and pertinent labs reviewed    Pulmonary            Asthma        Neuro/Psych   Within defined limits           Cardiovascular  Within defined limits                Exercise tolerance: >4 METS     GI/Hepatic/Renal  Within defined limits              Endo/Other        Anemia     Other Findings   Comments: Denies pregnancy           Physical Exam    Airway  Mallampati: II  TM Distance: 4 - 6 cm  Neck ROM: normal range of motion   Mouth opening: Normal     Cardiovascular  Regular rate and rhythm,  S1 and S2 normal,  no murmur, click, rub, or gallop  Rhythm: regular  Rate: normal         Dental    Dentition: Caps/crowns and Poor dentition     Pulmonary  Breath sounds clear to auscultation               Abdominal  GI exam deferred       Other Findings            Anesthetic Plan    ASA: 2  Anesthesia type: general          Induction: Intravenous  Anesthetic plan and risks discussed with: Patient

## 2019-11-26 NOTE — BRIEF OP NOTE
BRIEF OPERATIVE NOTE    Date of Procedure: 11/26/2019   Preoperative Diagnosis: MENORRHAGIA  Postoperative Diagnosis: MENORRHAGIA    Procedure(s):  robotic assisted total laparoscopic hysterectomy bilateral salpingectomy, right oophorectomy  Surgeon(s) and Role:     Corbin Crespo MD - Primary         Surgical Assistant: David Garrett    Surgical Staff:  Circ-1: Hansa Kinney RN  Scrub Tech-1: Aryan Hayes  Surg Asst-1: Coleman Rahman  Event Time In Time Out   Incision Start 1314    Incision Close 1438      Anesthesia: General   Estimated Blood Loss: 50 ml  Specimens:   ID Type Source Tests Collected by Time Destination   1 : UTERUS,CERVIX,BILATERAL FALLOPIAN TUBES AND RIGHT OVARY Fresh Uterus with Bilateral Fallopian Tubes  Gildardo Tee MD 11/26/2019 1402 Pathology      Findings: On exam under anesthesia, enlarged globular uterus. On laparoscopy, normal diaphragm, liver, stomach, mesentery, bowel, and omentum. Mildly dilated fallopian tubes bilaterally. Normal left ovary. Right ovary with multiple hemorrhagic appearing cysts. Uterus enlarged with multiple large fibroids including 5 cm anterior fibroid. There was minimal distortion of the bilateral adnexa and pelvic sidewalls by fibroids. Ureters were noted and traced bilaterally with incisions distant from ureters.   Complications: none  Implants: * No implants in log *

## 2019-11-26 NOTE — PERIOP NOTES
SHERWIN WAS GIVEN TO THE STERILE FIELD TO BE USED BY MD DURING PROCEDURE  REF: [id number]  LOT: [id number]  EXP: 10/28/2023

## 2019-11-27 VITALS
RESPIRATION RATE: 16 BRPM | HEART RATE: 78 BPM | DIASTOLIC BLOOD PRESSURE: 84 MMHG | OXYGEN SATURATION: 100 % | TEMPERATURE: 98.5 F | SYSTOLIC BLOOD PRESSURE: 137 MMHG

## 2019-11-27 LAB
ABO + RH BLD: NORMAL
BLD PROD TYP BPU: NORMAL
BLD PROD TYP BPU: NORMAL
BLOOD GROUP ANTIBODIES SERPL: NORMAL
BPU ID: NORMAL
BPU ID: NORMAL
CROSSMATCH RESULT,%XM: NORMAL
CROSSMATCH RESULT,%XM: NORMAL
ERYTHROCYTE [DISTWIDTH] IN BLOOD BY AUTOMATED COUNT: 16.8 % (ref 11.5–14.5)
HCT VFR BLD AUTO: 25.4 % (ref 35–47)
HGB BLD-MCNC: 7.9 G/DL (ref 11.5–16)
MCH RBC QN AUTO: 25 PG (ref 26–34)
MCHC RBC AUTO-ENTMCNC: 31.1 G/DL (ref 30–36.5)
MCV RBC AUTO: 80.4 FL (ref 80–99)
NRBC # BLD: 0 K/UL (ref 0–0.01)
NRBC BLD-RTO: 0 PER 100 WBC
PLATELET # BLD AUTO: 423 K/UL (ref 150–400)
PMV BLD AUTO: 8.9 FL (ref 8.9–12.9)
RBC # BLD AUTO: 3.16 M/UL (ref 3.8–5.2)
SPECIMEN EXP DATE BLD: NORMAL
STATUS OF UNIT,%ST: NORMAL
STATUS OF UNIT,%ST: NORMAL
UNIT DIVISION, %UDIV: 0
UNIT DIVISION, %UDIV: 0
WBC # BLD AUTO: 8.9 K/UL (ref 3.6–11)

## 2019-11-27 PROCEDURE — 74011250636 HC RX REV CODE- 250/636: Performed by: OBSTETRICS & GYNECOLOGY

## 2019-11-27 PROCEDURE — 36415 COLL VENOUS BLD VENIPUNCTURE: CPT

## 2019-11-27 PROCEDURE — 74011250637 HC RX REV CODE- 250/637: Performed by: OBSTETRICS & GYNECOLOGY

## 2019-11-27 PROCEDURE — 85027 COMPLETE CBC AUTOMATED: CPT

## 2019-11-27 RX ORDER — LANOLIN ALCOHOL/MO/W.PET/CERES
325 CREAM (GRAM) TOPICAL
Qty: 30 TAB | Refills: 3 | Status: SHIPPED | OUTPATIENT
Start: 2019-11-27 | End: 2022-02-08

## 2019-11-27 RX ORDER — IBUPROFEN 600 MG/1
600 TABLET ORAL
Qty: 100 TAB | Refills: 3 | Status: SHIPPED | OUTPATIENT
Start: 2019-11-27

## 2019-11-27 RX ORDER — OXYCODONE AND ACETAMINOPHEN 5; 325 MG/1; MG/1
1 TABLET ORAL
Qty: 30 TAB | Refills: 0 | Status: SHIPPED | OUTPATIENT
Start: 2019-11-27 | End: 2019-12-11

## 2019-11-27 RX ORDER — DOCUSATE SODIUM 100 MG/1
100 CAPSULE, LIQUID FILLED ORAL 2 TIMES DAILY
Qty: 60 CAP | Refills: 3 | Status: SHIPPED | OUTPATIENT
Start: 2019-11-27 | End: 2022-02-08

## 2019-11-27 RX ADMIN — KETOROLAC TROMETHAMINE 30 MG: 30 INJECTION, SOLUTION INTRAMUSCULAR at 00:14

## 2019-11-27 RX ADMIN — OXYCODONE HYDROCHLORIDE AND ACETAMINOPHEN 2 TABLET: 5; 325 TABLET ORAL at 02:27

## 2019-11-27 RX ADMIN — KETOROLAC TROMETHAMINE 30 MG: 30 INJECTION, SOLUTION INTRAMUSCULAR at 05:55

## 2019-11-27 RX ADMIN — FERROUS SULFATE TAB 325 MG (65 MG ELEMENTAL FE) 325 MG: 325 (65 FE) TAB at 09:42

## 2019-11-27 RX ADMIN — Medication 10 ML: at 05:58

## 2019-11-27 RX ADMIN — DOCUSATE SODIUM 100 MG: 100 CAPSULE, LIQUID FILLED ORAL at 09:42

## 2019-11-27 NOTE — PROGRESS NOTES
Gynecology Progress Note    Patient doing well post-op day 1 from Procedure(s):  robotic assisted total laparoscopic hysterectomy bilateral salpingectomy, right oophorectomy without significant complaints. Pain controlled on current medication. Voiding without difficulty. Patient is passing flatus. Vitals:  Blood pressure 126/70, pulse 90, temperature 98.2 °F (36.8 °C), resp. rate 16, last menstrual period 2019, SpO2 98 %, not currently breastfeeding. Temp (24hrs), Av.4 °F (36.9 °C), Min:97.6 °F (36.4 °C), Max:99.2 °F (37.3 °C)        Exam:  Patient without distress. Abdomen soft,  nontender. Incision dry and clean without erythema. Lower extremities are negative for swelling, cords, or tenderness. Lab/Data Review: All lab results for the last 24 hours reviewed. Assessment and Plan:  Patient appears to be having uncomplicated post Procedure(s):  robotic assisted total laparoscopic hysterectomy bilateral salpingectomy, right oophorectomy course. Continue routine post-op care.   Home today

## 2019-11-27 NOTE — PROGRESS NOTES
Bedside shift change report given to Mariana RN (oncoming nurse) by Jaylon Gamboa RN (offgoing nurse). Report included the following information SBAR, Kardex, Procedure Summary, Intake/Output, MAR, Accordion and Recent Results.

## 2019-11-27 NOTE — DISCHARGE INSTRUCTIONS
Patient Education        Vaginal Hysterectomy: What to Expect at [address] [place] [address] can expect to feel better and stronger each day, although you may need pain medicine for a week or two. You may get tired easily or have less energy than usual. This may last for several weeks after surgery. You will probably notice that your belly is swollen and puffy. This is common. The swelling will take several weeks to go down. You may take 4 to 6 weeks to fully recover. It is important to avoid lifting while you are recovering so that you can heal.  This care sheet gives you a general idea about how long it will take for you to recover. But each person recovers at a different pace. Follow the steps below to get better as quickly as possible. How can you care for yourself at home? Activity    · Rest when you feel tired. Getting enough sleep will help you recover.     · Try to walk each day. Start by walking a little more than you did the day before. Bit by bit, increase the amount you walk. Walking boosts blood flow and helps prevent pneumonia and constipation.     · Avoid lifting anything that would make you strain. This may include a child, heavy grocery bags and milk containers, a heavy briefcase or backpack, cat litter or dog food bags, or a vacuum .     · Avoid strenuous activities, such as biking, jogging, weight lifting, or aerobic exercise, until your doctor says it is okay.     · You may shower. If you have incisions in your belly, pat them dry when you are done. Do not take a bath for the first 2 weeks or until your doctor tells you it is okay.     · Ask your doctor when you can drive again.     · You will probably need to take 2 to 4 weeks off from work. It depends on the type of work you do and how you feel.     · Your doctor will tell you when you can have sex again. Diet    · You can eat your normal diet.  If your stomach is upset, try bland, low-fat foods like plain rice, broiled chicken, toast, and yogurt.     · Drink plenty of fluids (unless your doctor tells you not to).     · You may notice that your bowel movements are not regular right after your surgery. This is common. Try to avoid constipation and straining with bowel movements. You may want to take a fiber supplement every day. If you have not had a bowel movement after a couple of days, ask your doctor about taking a mild laxative. Medicines    · Your doctor will tell you if and when you can restart your medicines. He or she will also give you instructions about taking any new medicines.     · If you take blood thinners, such as warfarin (Coumadin), clopidogrel (Plavix), or aspirin, be sure to talk to your doctor. He or she will tell you if and when to start taking those medicines again. Make sure that you understand exactly what your doctor wants you to do.     · Take pain medicines exactly as directed. ? If the doctor gave you a prescription medicine for pain, take it as prescribed. ? If you are not taking a prescription pain medicine, ask your doctor if you can take an over-the-counter medicine.     · If your doctor prescribed antibiotics, take them as directed. Do not stop taking them just because you feel better. You need to take the full course of antibiotics.     · If you think your pain medicine is making you sick to your stomach:  ? Take your medicine after meals (unless your doctor tells you not to). ? Ask your doctor for a different pain medicine. Incision care    · If you had surgery with a laparoscope, you may have stitches over the cuts (incisions) the doctor made in your belly. If you have strips of tape over the incisions, leave the tape on for a week or until it falls off. Or follow your doctor's instructions for removing the tape.     · Wash the area daily with warm, soapy water and pat it dry. Don't use hydrogen peroxide or alcohol, which can slow healing.  You may cover the area with a gauze bandage if it weeps or rubs against clothing. Change the bandage every day.     · Keep the area clean and dry. Other instructions    · You may have some light vaginal bleeding. Wear sanitary pads if needed. Do not douche or use tampons. Follow-up care is a key part of your treatment and safety. Be sure to make and go to all appointments, and call your doctor if you are having problems. It's also a good idea to know your test results and keep a list of the medicines you take. When should you call for help? Call 911 anytime you think you may need emergency care. For example, call if:    · You passed out (lost consciousness).     · You have chest pain, are short of breath, or cough up blood.    Call your doctor now or seek immediate medical care if:    · You have bright red vaginal bleeding that soaks one or more pads in an hour, or you have large clots.     · You have vaginal discharge that has increased in amount or smells bad.     · You are sick to your stomach or cannot drink fluids.     · You have pain that does not get better after you take pain medicine.     · You have loose stitches, or your incision comes open.     · You have signs of infection, such as:  ? Increased pain, swelling, warmth, or redness. ? Red streaks leading from the incision. ? Pus draining from the incision. ? A fever.     · You have signs of a blood clot in your leg (called deep vein thrombosis), such as:  ? Pain in your calf, back of knee, thigh, or groin. ? Redness and swelling in your leg or groin.     · You cannot pass stools or gas.     · Bright red blood has soaked through the bandage over the incision.    Watch closely for changes in your health, and be sure to contact your doctor if you have any problems. Where can you learn more? Go to http://eden-jay.info/. Enter C542 in the search box to learn more about \"Vaginal Hysterectomy: What to Expect at Home. \"  Current as of: February 19, 2019  Content Version: 12.2  © 0384-6312 [employer]. Care instructions adapted under license by [employer] (which disclaims liability or warranty for this information). If you have questions about a medical condition or this instruction, always ask your healthcare professional. [address] any warranty or liability for your use of this information.

## 2019-11-27 NOTE — PROGRESS NOTES
CM gave patient the Patient Guide to Observation and Outpatient Care which patient signed--original to chart and copy to patient.

## 2019-11-27 NOTE — PROGRESS NOTES
Problem: Falls - Risk of  Goal: *Absence of Falls  Description  Document Alfonso Rc Fall Risk and appropriate interventions in the flowsheet. Outcome: Progressing Towards Goal  Note: Fall Risk Interventions:  Mobility Interventions: Patient to call before getting OOB              Elimination Interventions: Patient to call for help with toileting needs              Problem: Patient Education: Go to Patient Education Activity  Goal: Patient/Family Education  Outcome: Progressing Towards Goal     Problem: Pain  Goal: *Control of Pain  Outcome: Progressing Towards Goal     Problem: Pressure Injury - Risk of  Goal: *Prevention of pressure injury  Description  Document Ivan Scale and appropriate interventions in the flowsheet. Outcome: Progressing Towards Goal  Note: Pressure Injury Interventions:             Activity Interventions: Pressure redistribution bed/mattress(bed type)         Nutrition Interventions: Document food/fluid/supplement intake                     Problem: Patient Education: Go to Patient Education Activity  Goal: Patient/Family Education  Outcome: Progressing Towards Goal     Problem: Discharge Planning  Goal: *Discharge to safe environment  Outcome: Progressing Towards Goal  Goal: *Knowledge of medication management  Outcome: Progressing Towards Goal  Goal: *Knowledge of discharge instructions  Outcome: Progressing Towards Goal     Problem: Patient Education: Go to Patient Education Activity  Goal: Patient/Family Education  Outcome: Progressing Towards Goal     Problem: Patient Education: Go to Patient Education Activity  Goal: Patient/Family Education  Outcome: Progressing Towards Goal     Problem: Vaginal Hysterectomy: Day of Surgery  Goal: Off Pathway (Use only if patient is Off Pathway)  Outcome: Progressing Towards Goal  Goal: Activity/Safety  Outcome: Progressing Towards Goal  Goal: Consults, if ordered  Outcome: Progressing Towards Goal  Goal: Diagnostic Test/Procedures  Outcome: Progressing Towards Goal  Goal: Nutrition/Diet  Outcome: Progressing Towards Goal  Goal: Discharge Planning  Outcome: Progressing Towards Goal  Goal: Medications  Outcome: Progressing Towards Goal  Goal: Respiratory  Outcome: Progressing Towards Goal  Goal: Treatments/Interventions/Procedures  Outcome: Progressing Towards Goal  Goal: Psychosocial  Outcome: Progressing Towards Goal  Goal: *Hemodynamically stable  Outcome: Progressing Towards Goal  Goal: *Optimal pain control at patient's stated goal  Outcome: Progressing Towards Goal  Goal: *Absence of infection signs and symptoms  Outcome: Progressing Towards Goal     Problem: Vaginal Hysterectomy: Post-Op Day 1/Day of Discharge  Goal: Off Pathway (Use only if patient is Off Pathway)  Outcome: Progressing Towards Goal  Goal: Activity/Safety  Outcome: Progressing Towards Goal  Goal: Consults, if ordered  Outcome: Progressing Towards Goal  Goal: Diagnostic Test/Procedures  Outcome: Progressing Towards Goal  Goal: Nutrition/Diet  Outcome: Progressing Towards Goal  Goal: Discharge Planning  Outcome: Progressing Towards Goal  Goal: Medications  Outcome: Progressing Towards Goal  Goal: Respiratory  Outcome: Progressing Towards Goal  Goal: Treatments/Interventions/Procedures  Outcome: Progressing Towards Goal  Goal: Psychosocial  Outcome: Progressing Towards Goal     Problem: Vaginal Hysterectomy: Discharge Outcomes  Goal: *Demonstrates progressive activity  Outcome: Progressing Towards Goal  Goal: *Tolerating diet  Outcome: Progressing Towards Goal  Goal: *Hemodynamically stable  Outcome: Progressing Towards Goal  Goal: *Describes available resources and support systems  Outcome: Progressing Towards Goal  Goal: *Optimal pain control at patient's stated goal  Outcome: Progressing Towards Goal  Goal: *Verbalizes understanding and describes medication purposes and frequencies  Outcome: Progressing Towards Goal  Goal: *Lungs clear or at baseline  Outcome: Progressing Towards Goal  Goal: *Active bowel function  Outcome: Progressing Towards Goal  Goal: *Voiding  Outcome: Progressing Towards Goal  Goal: *Verbalizes and demonstrates incision care  Outcome: Progressing Towards Goal  Goal: *Expresses feelings about diagnosis and surgery  Outcome: Progressing Towards Goal  Goal: *Received and verbalizes understanding of discharge plan and instructions  Outcome: Progressing Towards Goal

## 2019-11-27 NOTE — DISCHARGE SUMMARY
Discharge Summary     Name: Flex Ch MRN: [id number]  SSN: [id number]    YOB: 1970  Age: 54 y.o. Sex: female      Allergies: Patient has no known allergies. Admit Date: 11/26/2019    Discharge Date: 11/27/2019      Admitting Physician: [place]ia Pompa MD     * Admission Diagnoses: Abnormal uterine bleeding and Fibroids    * Discharge Diagnoses:   Hospital Problems as of 11/27/2019 Date Reviewed: 11/27/2019          Codes Class Noted - Resolved POA    Menorrhagia ICD-10-CM: N92.0  ICD-9-CM: 626.2  11/26/2019 - Present Unknown        Anemia ICD-10-CM: D64.9  ICD-9-CM: 285.9  11/26/2019 - Present Unknown               * Procedures: Robotic assisted total laparoscopic hysterectomy and bilateral salpingectomy and right oophorectomy,  Blood transfusion    * Discharge Condition: [facility] Course: Patient admitted for preoperative blood transfusion. She received 2U PRBCs, one of which was completed prior to surgery and the other which was completed intraoperatively. Robotic assisted total laparoscopic hysterectomy and bilateral salpingectomy and right oophorectomy was performed without complications. Normal hospital course for this procedure. Patient with appropriate rise in Hgb. She was discharged home on POD1.     Significant Diagnostic Studies:   Recent Results (from the past 24 hour(s))   POC HEMOGLOBIN    Collection Time: 11/26/19 11:35 AM   Result Value Ref Range    Hemoglobin (POC) 7.3 (L) 11.5 - 16.0 g/dL   POC HEMOGLOBIN    Collection Time: 11/26/19  1:53 PM   Result Value Ref Range    Hemoglobin (POC) 8.2 (L) 11.5 - 16.0 g/dL   POC HEMOGLOBIN    Collection Time: 11/26/19  1:54 PM   Result Value Ref Range    Hemoglobin (POC) 6.3 (L) 11.5 - 16.0 g/dL   POC HEMOGLOBIN    Collection Time: 11/26/19  1:58 PM   Result Value Ref Range    Hemoglobin (POC) 6.5 (L) 11.5 - 16.0 g/dL   POC HEMOGLOBIN    Collection Time: 11/26/19  2:01 PM   Result Value Ref Range    Hemoglobin (POC) 7.4 (L) 11.5 - 16.0 g/dL   CBC W/O DIFF    Collection Time: 11/26/19  3:15 PM   Result Value Ref Range    WBC 10.9 3.6 - 11.0 K/uL    RBC 3.22 (L) 3.80 - 5.20 M/uL    HGB 8.2 (L) 11.5 - 16.0 g/dL    HCT 26.4 (L) 35.0 - 47.0 %    MCV 82.0 80.0 - 99.0 FL    MCH 25.5 (L) 26.0 - 34.0 PG    MCHC 31.1 30.0 - 36.5 g/dL    RDW 16.8 (H) 11.5 - 14.5 %    PLATELET 784 (H) 064 - 400 K/uL    MPV 9.2 8.9 - 12.9 FL    NRBC 0.0 0  WBC    ABSOLUTE NRBC 0.00 0.00 - 0.01 K/uL   CBC W/O DIFF    Collection Time: 11/27/19  4:49 AM   Result Value Ref Range    WBC 8.9 3.6 - 11.0 K/uL    RBC 3.16 (L) 3.80 - 5.20 M/uL    HGB 7.9 (L) 11.5 - 16.0 g/dL    HCT 25.4 (L) 35.0 - 47.0 %    MCV 80.4 80.0 - 99.0 FL    MCH 25.0 (L) 26.0 - 34.0 PG    MCHC 31.1 30.0 - 36.5 g/dL    RDW 16.8 (H) 11.5 - 14.5 %    PLATELET 433 (H) 561 - 400 K/uL    MPV 8.9 8.9 - 12.9 FL    NRBC 0.0 0  WBC    ABSOLUTE NRBC 0.00 0.00 - 0.01 K/uL       * Disposition: Home    Discharge Medications:   Current Discharge Medication List      START taking these medications    Details   docusate sodium (COLACE) 100 mg capsule Take 1 Cap by mouth two (2) times a day. Qty: 60 Cap, Refills: 3      ferrous sulfate 325 mg (65 mg iron) tablet Take 1 Tab by mouth daily (with breakfast). Qty: 30 Tab, Refills: 3      oxyCODONE-acetaminophen (PERCOCET) 5-325 mg per tablet Take 1 Tab by mouth every six (6) hours as needed for Pain for up to 14 days. Max Daily Amount: 4 Tabs. Qty: 30 Tab, Refills: 0    Associated Diagnoses: Fibroids         CONTINUE these medications which have CHANGED    Details   ibuprofen (MOTRIN) 600 mg tablet Take 1 Tab by mouth every six (6) hours as needed for Pain. Qty: 100 Tab, Refills: 3         CONTINUE these medications which have NOT CHANGED    Details   albuterol (PROVENTIL HFA, VENTOLIN HFA, PROAIR HFA) 90 mcg/actuation inhaler Take 1 Puff by inhalation every four (4) hours as needed for Wheezing.          STOP taking these medications medroxyPROGESTERone (PROVERA) 10 mg tablet Comments:   Reason for Stopping:         medroxyPROGESTERone (DEPO-PROVERA) 150 mg/mL injection Comments:   Reason for Stopping:                * Follow-up Care/Patient Instructions: Activity: No sex, douching, or tampons for 6 weeks or as directed by your physician. No heavy lifting for 6 weeks. No driving while taking pain medication.   Diet: Resume pre-hospital diet  Wound Care: Keep wound clean and dry    Follow-up Information     Follow up With Specialties Details Why Contact Info    Mila Smiley MD Internal Medicine   [facility]. [address].  [address] [address]  [phone]      Yuri Lang MD Obstetrics & Gynecology Schedule an appointment as soon as possible for a visit in 2 weeks  [address]  [address] [phone]

## 2019-11-27 NOTE — PROGRESS NOTES
Bedside and Verbal shift change report given to [address] Garcia (oncoming nurse) by Amanda Salvador RN (offgoing nurse). Report included the following information SBAR, Kardex, ED Summary, OR Summary, Procedure Summary, Intake/Output and MAR.

## 2019-12-10 ENCOUNTER — OFFICE VISIT (OUTPATIENT)
Dept: OBGYN CLINIC | Age: 49
End: 2019-12-10

## 2019-12-10 VITALS
HEART RATE: 72 BPM | SYSTOLIC BLOOD PRESSURE: 143 MMHG | HEIGHT: 64 IN | BODY MASS INDEX: 28 KG/M2 | DIASTOLIC BLOOD PRESSURE: 90 MMHG | RESPIRATION RATE: 18 BRPM | OXYGEN SATURATION: 99 % | WEIGHT: 164 LBS

## 2019-12-10 DIAGNOSIS — Z09 POSTOPERATIVE EXAMINATION: Primary | ICD-10-CM

## 2019-12-10 NOTE — PROGRESS NOTES
Chief Complaint   Patient presents with    Post OP Follow Up     Pt c/o \" something pulling in her naval\". Pt denies bleeding. 1. Have you been to the ER, urgent care clinic since your last visit? Hospitalized since your last visit? No    2. Have you seen or consulted any other health care providers outside of the [address] [place] [address] since your last visit? Include any pap smears or colon screening.  No

## 2019-12-10 NOTE — PROGRESS NOTES
54 y.o. y/o who presents for postoperative exam.  Pt is s/p Robotic assisted total laparoscopic hysterectomy and bilateral salpingectomy and right oophorectomy on 11/26/19. Pt is doing well without complaints. A little bit of pulling around her umbilicus. No other pain. No bleeding. Energy level is improving. Surgical pathology:    FINAL PATHOLOGIC DIAGNOSIS   Uterus, cervix, bilateral fallopian tubes, right ovary, total hysterectomy, bilateral salpingectomy, and right oophorectomy:   Cervix: Negative for microscopic pathologic abnormality. Endometrium: Inactive endometrium, negative for hyperplasia or malignancy. Myometrium: Leiomyomata   Bilateral fallopian tubes: Negative for microscopic pathologic abnormality. Right ovary: Negative for microscopic pathologic abnormality. ROS: Negative other than per HPI     Physical exam:  Gen: AOx3, NAD  Resp: No respiratory distress  Abd: Soft, nontender, incisions healing well  GYN: Vaginal cuff well healed and intact. No blood and minimal discharge in vaginal vault.      Plan:  - Continue restrictions for 4 weeks    Return to clinic in 4 weeks

## 2020-01-02 ENCOUNTER — OFFICE VISIT (OUTPATIENT)
Dept: OBGYN CLINIC | Age: 50
End: 2020-01-02

## 2020-01-02 VITALS
BODY MASS INDEX: 29.02 KG/M2 | RESPIRATION RATE: 18 BRPM | SYSTOLIC BLOOD PRESSURE: 132 MMHG | HEIGHT: 64 IN | DIASTOLIC BLOOD PRESSURE: 84 MMHG | WEIGHT: 170 LBS

## 2020-01-02 DIAGNOSIS — R23.2 HOT FLASHES: ICD-10-CM

## 2020-01-02 DIAGNOSIS — Z09 POSTOPERATIVE EXAMINATION: Primary | ICD-10-CM

## 2020-01-02 NOTE — PROGRESS NOTES
Chief Complaint   Patient presents with    Post OP Follow Up     Pt voices no complaints. 3 most recent PHQ Screens 1/2/2020   Little interest or pleasure in doing things Not at all   Feeling down, depressed, irritable, or hopeless Not at all   Total Score PHQ 2 0     1. Have you been to the ER, urgent care clinic since your last visit? Hospitalized since your last visit? No    2. Have you seen or consulted any other health care providers outside of the [address] [place] [address] since your last visit? Include any pap smears or colon screening.  No

## 2020-01-02 NOTE — PATIENT INSTRUCTIONS
Menopause Diet: Care Instructions  Your Care Instructions    Healthy eating helps ease menopause symptoms. And it can reduce your risk for getting conditions such as osteoporosis and heart disease. Follow-up care is a key part of your treatment and safety. Be sure to make and go to all appointments, and call your doctor if you are having problems. It's also a good idea to know your test results and keep a list of the medicines you take. How can you care for yourself at home? · Limit fats in your diet. · Choose foods that have a lot of calcium. The recommended daily intake for adults ages 23 to 48 is 1,000 milligrams (mg). Adults over 50 need 1,200 mg a day. If you don't get enough calcium in the foods you eat, ask your doctor if taking a supplement is right for you. · Add vitamin D to your daily diet. It helps your body use calcium. The recommended daily intake of vitamin D is 600 international units (IU) a day for children and adults up to age 79. Adults age 70 and older need 800 IU a day. If you don't get enough vitamin D in the foods you eat, ask your doctor if taking a supplement is right for you. · Include good sources of fiber in your diet each day. These include whole grains, beans, fruits, and vegetables. · Avoid simple sugars. This helps if you have mood swings, anxiety, or depression. · Avoid caffeine, or cut back on it. Caffeine can cause sleep problems. It can also make you feel anxious. To relieve these symptoms, pay attention to how much caffeine you are getting in drinks and chocolate. · Limit your intake of alcohol. For some women, drinking may make symptoms worse. Where can you learn more? Go to http://eden-jay.info/. Enter J355 in the search box to learn more about \"Menopause Diet: Care Instructions. \"  Current as of: November 7, 2018  Content Version: 12.2  © 6378-2465 [employer].  Care instructions adapted under license by [employer] (which disclaims liability or warranty for this information). If you have questions about a medical condition or this instruction, always ask your healthcare professional. [address] any warranty or liability for your use of this information. Learning About Menopause  What is menopause? For most women, menopause is a natural process of aging. Menstrual periods gradually stop. The ability to become pregnant ends. Some women feel relief that their childbearing years are ending. But other women struggle with the physical and emotional changes that come with menopause. For most women, menopause happens around age 48. But every woman's body has its own timeline. Some women stop having periods in their mid-40s. Others keep having periods well into their 50s. And some women go through menopause early because of cancer treatment or surgery to remove the ovaries. What can you expect with menopause? · It starts with perimenopause. This is the process of change that leads up to menopause. Perimenopause can start as early as your late 35s or as late as your early 46s. How long it lasts varies. But it usually lasts from 2 to 8 years. · During this time, your hormone levels will go up and down unevenly (fluctuate). This causes changes in your periods and other symptoms. In time, estrogen and progesterone levels drop enough that the menstrual cycle stops. Going a full year without having a period is usually considered menopause. · Low estrogen levels after menopause speed bone loss. This increases your risk of osteoporosis. Also, your risk of heart disease increases after menopause. · It's normal to have thinner, dryer, wrinkled skin after menopause. The vaginal lining and the lower urinary tract also thin and weaken. This can make it hard to have sex. It can also increase the risk of vaginal and urinary tract infections. What are the symptoms? · Lighter or heavier periods.  Your menstrual cycle may be longer or shorter. You may skip periods. · Hot flashes. You may have a sudden feeling of intense body heat. You may sweat, and your head, neck, and chest may get red. Along with hot flashes, you may have a heartbeat that's too fast or not regular. You may also feel anxious or grouchy. In rare cases you might feel panic. · Trouble sleeping. · Mood swings or feeling grouchy, depressed, or worried. · Problems with remembering or thinking clearly. · Vaginal dryness. Some women have only a few mild symptoms. Others have severe symptoms that disrupt their sleep and daily lives. Symptoms tend to last or get worse the first year or more after menopause. Over time, hormones even out at low levels. Many symptoms improve or go away. But some women may have symptoms that don't go away. How are menopause symptoms treated?   If you have mild symptoms, you may get some relief if you eat healthy foods, exercise, and lower your stress. Some women choose to take medicines if they have severe symptoms that aren't helped by making changes to their lifestyle.   Lifestyle changes    · Choose a heart-healthy diet that is low in saturated fat. It should include plenty of fruits, vegetables, beans, and high-fiber grains and breads. Be sure you get enough calcium and vitamin D to help your bones stay strong. Low-fat or nonfat dairy products are a great source of calcium.     · Get regular exercise. Exercise can help you manage your weight, keep your heart and bones strong, and lift your mood.     · Limit caffeine, alcohol, and stress. These things may make symptoms worse. Limiting them may help you sleep better.     · If you smoke, stop. Quitting smoking can reduce hot flashes and long-term health risks. Medicines   If your symptoms are severe, talk with your doctor.  You may want to try prescription medicines, such as:    · Low-dose birth control pills before menopause.     · Low-dose hormone therapy (HT) after menopause.     · Antidepressants.     · A medicine called clonidine (Catapres) that is usually used to treat high blood pressure.    All medicines for menopause symptoms have possible risks or side effects. A very small number of women develop serious health problems when taking hormone therapy. Be sure to talk to your doctor about your possible health risks before you start a treatment for menopause symptoms.   Other treatments   You can try:    · Breathing exercises. They may help reduce hot flashes and emotional symptoms.     · Soy. Some women feel that eating lots of soy helps even out their menopause symptoms.     · Yoga or biofeedback. They may help reduce stress. Follow-up care is a key part of your treatment and safety. Be sure to make and go to all appointments, and call your doctor if you are having problems. It's also a good idea to know your test results and keep a list of the medicines you take. Where can you learn more? Go to http://edenPwnie Expressjay.info/. Enter H199 in the search box to learn more about \"Learning About Menopause. \"  Current as of: February 19, 2019  Content Version: 12.2  © 3658-3034 [employer]. Care instructions adapted under license by [employer] (which disclaims liability or warranty for this information). If you have questions about a medical condition or this instruction, always ask your healthcare professional. [address] any warranty or liability for your use of this information. Hot Flashes During Menopause: Care Instructions  Your Care Instructions    A hot flash is a sudden feeling of intense body heat. Your head, neck, and chest may get red. Your heartbeat may speed up, and you may feel anxious or irritable. You may find that hot flashes occur more often in warm rooms or during stressful times.  Hot flashes and other symptoms are a normal response to the hormone changes that occur before your menstrual cycle goes away completely (menopause). Hot flashes often get better and go away with time. Making a few changes, such as exercising more, practicing meditation, quitting smoking, and drinking less alcohol, can help. Some women take hormone pills or other medicine to treat bothersome symptoms. Follow-up care is a key part of your treatment and safety. Be sure to make and go to all appointments, and call your doctor if you are having problems. It's also a good idea to know your test results and keep a list of the medicines you take. How can you care for yourself at home? · If you decide to take medicine to treat hot flashes, take it exactly as prescribed. Call your doctor if you think you are having a problem with your medicine. You will get more details on the specific medicine your doctor prescribes. · Learn to meditate. Sit quietly and focus on your breathing. Try to practice each day. Books, classes, and tapes can help you start a program.  · Wear natural fabrics, such as cotton and silk. Dress in layers so you can take off clothes as needed. · Keep the room temperature cool, or use a fan. You are more likely to have a hot flash when you are too warm than when you are cool. · Use fewer blankets when you sleep at night. · Drink cold fluids rather than hot ones. Limit your intake of caffeine and alcohol. · Eat smaller meals more often during the day so your body makes less heat than when digesting large amounts of food. Eat low-fat and high-fiber foods. · Do not smoke. Smoking can make hot flashes worse. If you need help quitting, talk to your doctor about stop-smoking programs and medicines. These can increase your chances of quitting for good. · Get at least 30 minutes of exercise on most days of the week. Walking is a good choice. You also may want to do other activities, such as running, swimming, cycling, or playing tennis or team sports. Where can you learn more?   Go to http://eden-jay.info/. Enter F700 in the search box to learn more about \"Hot Flashes During Menopause: Care Instructions. \"  Current as of: February 19, 2019  Content Version: 12.2  © 9986-6324 [employer]. Care instructions adapted under license by [employer] (which disclaims liability or warranty for this information). If you have questions about a medical condition or this instruction, always ask your healthcare professional. [address] any warranty or liability for your use of this information. Hormone Therapy (HT): Care Instructions  Your Care Instructions    Hormone therapy (HT) is medicine to treat symptoms of menopause, such as hot flashes, vaginal dryness, and sleep problems. It replaces the hormones that drop at menopause. Most women get relief from these symptoms within weeks of starting HT. HT contains two female hormones, estrogen and progestin. HT may come in the form of a pill, patch, gel, spray, or vaginal ring. A vaginal cream or a vaginal ring that has a much lower dose of estrogen may be used to relieve vaginal dryness only. HT has some risks. Most doctors recommend that women only take HT for as short a time as possible. This is to reduce the chances of heart disease, breast cancer, blood clots, and stroke that may be connected to HT. Be sure to have regular checkups with your doctor when taking HT. Talk with your doctor about whether HT is right for you. If you decide that the benefits of HT outweigh the risks, ask your doctor to prescribe the lowest effective dose for as short a time as possible. Follow-up care is a key part of your treatment and safety. Be sure to make and go to all appointments, and call your doctor if you are having problems. It's also a good idea to know your test results and keep a list of the medicines you take. Why might you take HT?  · HT reduces symptoms of menopause.  These include hot flashes, mood swings, and sleep problems. · The estrogen in HT helps to prevent thinning bones. And it may lower the chance of colon cancer. · HT helps keep the lining of the vagina moist and thick. This can reduce irritation. · HT helps protect against dental problems, such as tooth loss and gum disease. What are the risks of taking HT? · Some women who take HT may have vaginal bleeding, bloating, nausea, sore breasts, mood swings, and headaches. Talk to your doctor about changing the type of HT you take or lowering the dose. This may help to end these side effects. · Taking HT may slightly increase your risk for heart disease, breast cancer, ovarian cancer, blood clots, and stroke. · You should not take HT if you:  ? Could be pregnant. ? Have a personal history of breast cancer, endometrial cancer, pulmonary embolism, deep vein thrombosis, heart attack, or stroke. ? Have vaginal bleeding from an unknown cause. ? Have active liver disease. What can you do to reduce the symptoms of menopause? · Eat healthy foods and get regular exercise. This also will help to maintain strong bones and a healthy heart. · Do not smoke. If you smoke, you can reduce hot flashes and long-term health risks by stopping. If you need help quitting, talk to your doctor about stop-smoking programs and medicines. These can increase your chances of quitting for good. · Practice daily breathing exercises (meditation) to reduce hot flashes and mood swings. · Limit the amount of alcohol you drink. This can reduce symptoms of menopause and long-term health risks. · Keep your home and office cool. · Use a vaginal lubricant, such as Astroglide, Wet Gel Lubricant, or K-Y Jelly. · Do pelvic floor (Kegel) exercises, which tighten and strengthen pelvic muscles. To do Kegel exercises:  ? Squeeze the same muscles you would use to stop your urine. Your belly and thighs should not move. ?  Hold the squeeze for 3 seconds, then relax for 3 seconds. ? Start with 3 seconds. Then add 1 second each week until you are able to squeeze for 10 seconds. ? Repeat the exercise 10 to 15 times a session. Do three or more sessions a day. Where can you learn more? Go to http://eden-jay.info/. Enter [id number] [id number] in the search box to learn more about \"Hormone Therapy (HT): Care Instructions. \"  Current as of: February 19, 2019  Content Version: 12.2  © 3046-7775 [employer]. Care instructions adapted under license by [facility] (which disclaims liability or warranty for this information). If you have questions about a medical condition or this instruction, always ask your healthcare professional. [address] any warranty or liability for your use of this information.

## 2020-01-02 NOTE — PROGRESS NOTES
54 y.o. y/o who presents for postoperative exam.  Pt is s/p Robotic assisted total laparoscopic hysterectomy and bilateral salpingectomy and right oophorectomy on 11/26/19. Pt is doing well without complaints. No pain. No bleeding or discharge. Energy is back to normal.  No problems with incisions other than a stitch sticking out of umbilical incision. +hot flashes at night and night sweats, started after surgery. Surgical pathology:   FINAL PATHOLOGIC DIAGNOSIS   Uterus, cervix, bilateral fallopian tubes, right ovary, total hysterectomy, bilateral salpingectomy, and right oophorectomy:   Cervix: Negative for microscopic pathologic abnormality. Endometrium: Inactive endometrium, negative for hyperplasia or malignancy. Myometrium: Leiomyomata   Bilateral fallopian tubes: Negative for microscopic pathologic abnormality. Right ovary: Negative for microscopic pathologic abnormality. ROS: Negative other than per HPI     Physical exam:  Gen: AOx3, NAD  Resp: No respiratory distress  Abd: Soft, nontender, incisions healing well  GYN: Vaginal cuff well healed and intact. No blood and minimal discharge in vaginal vault. Plan:  - Cleared of all restrictions  - if no improvement in hot flashes after about a month, patient to call and we will start her on HRT. Discussed risks and benefits with patient.     Return to clinic PRN or for annual exam

## 2020-03-02 ENCOUNTER — TELEPHONE (OUTPATIENT)
Dept: OBGYN CLINIC | Age: 50
End: 2020-03-02

## 2020-03-03 RX ORDER — ESTRADIOL 0.05 MG/D
1 PATCH TRANSDERMAL
Qty: 4 PATCH | Refills: 12 | Status: SHIPPED | OUTPATIENT
Start: 2020-03-07 | End: 2020-12-14

## 2020-03-03 NOTE — TELEPHONE ENCOUNTER
Prescription for climara patch sent to preferred pharmacy. She should change the patch weekly. She should give it a month at this level. If hot flashes are not tolerable, she should call and we can call in an increased dose. Thank you.

## 2020-03-04 ENCOUNTER — TELEPHONE (OUTPATIENT)
Dept: OBGYN CLINIC | Age: 50
End: 2020-03-04

## 2020-03-05 NOTE — TELEPHONE ENCOUNTER
Prescription for daily premarin tablet sent to preferred pharmacy. We have discount card available if they try to charge her too much. Thank you.

## 2020-03-05 NOTE — TELEPHONE ENCOUNTER
Pt state that she is unable to afford estradioL (CLIMARA) 0.05 mg/24 hr.  She would like another medication called into her pharmacy

## 2020-03-06 ENCOUNTER — TELEPHONE (OUTPATIENT)
Dept: OBGYN CLINIC | Age: 50
End: 2020-03-06

## 2020-03-09 RX ORDER — ESTRADIOL 0.5 MG/1
0.5 TABLET ORAL DAILY
Qty: 30 TAB | Refills: 12 | Status: SHIPPED | OUTPATIENT
Start: 2020-03-09 | End: 2020-12-14

## 2020-11-03 ENCOUNTER — OFFICE VISIT (OUTPATIENT)
Dept: OBGYN CLINIC | Age: 50
End: 2020-11-03
Payer: COMMERCIAL

## 2020-11-03 VITALS
HEIGHT: 64 IN | WEIGHT: 206 LBS | DIASTOLIC BLOOD PRESSURE: 64 MMHG | SYSTOLIC BLOOD PRESSURE: 121 MMHG | BODY MASS INDEX: 35.17 KG/M2

## 2020-11-03 DIAGNOSIS — Z11.3 SCREENING FOR STD (SEXUALLY TRANSMITTED DISEASE): ICD-10-CM

## 2020-11-03 DIAGNOSIS — E66.01 SEVERE OBESITY (HCC): ICD-10-CM

## 2020-11-03 DIAGNOSIS — Z01.419 ENCOUNTER FOR GYNECOLOGICAL EXAMINATION WITHOUT ABNORMAL FINDING: Primary | ICD-10-CM

## 2020-11-03 LAB
COMMENT, HOLDF: NORMAL
HBV SURFACE AG SER QL: <0.1 INDEX
HBV SURFACE AG SER QL: NEGATIVE
HIV 1+2 AB+HIV1 P24 AG SERPL QL IA: NONREACTIVE
HIV12 RESULT COMMENT, HHIVC: NORMAL
SAMPLES BEING HELD,HOLD: NORMAL

## 2020-11-03 PROCEDURE — 99386 PREV VISIT NEW AGE 40-64: CPT | Performed by: OBSTETRICS & GYNECOLOGY

## 2020-11-03 NOTE — PROGRESS NOTES
Annual exam    Maximus Pizarro is a 52 y.o. presenting for annual exam.   Her main concerns today include  No  Concerns mentioned     Ob/Gyn Hx:  No obstetric history on file. Patient's last menstrual period was 11/26/2019. Menopause- age / hysterectomy    Health maintenance:  Pap-N/A s/p hysterectomy  Mammo-needs  Colonoscopy- not yet    Past Medical History:   Diagnosis Date    Asthma     Heavy menses        Past Surgical History:   Procedure Laterality Date    RAMAKRISHNA US BX BREAST RT 1ST LESION W/CLIP AND SPECIMEN Right     Neg       Family History   Problem Relation Age of Onset    Hypertension Mother     Asthma Father     Asthma Maternal Grandmother     Cancer Maternal Grandmother         ?  TYPE CA    Diabetes Neg Hx     Heart Disease Neg Hx        Social History     Socioeconomic History    Marital status: SINGLE     Spouse name: Not on file    Number of children: Not on file    Years of education: Not on file    Highest education level: Not on file   Occupational History    Not on file   Social Needs    Financial resource strain: Not on file    Food insecurity     Worry: Not on file     Inability: Not on file    Transportation needs     Medical: Not on file     Non-medical: Not on file   Tobacco Use    Smoking status: Never Smoker    Smokeless tobacco: Never Used   Substance and Sexual Activity    Alcohol use: Not Currently    Drug use: No    Sexual activity: Not Currently     Partners: Male   Lifestyle    Physical activity     Days per week: Not on file     Minutes per session: Not on file    Stress: Not on file   Relationships    Social connections     Talks on phone: Not on file     Gets together: Not on file     Attends [employer] service: Not on file     Active member of club or organization: Not on file     Attends meetings of clubs or organizations: Not on file     Relationship status: Not on file    Intimate partner violence     Fear of current or ex partner: Not on file Emotionally abused: Not on file     Physically abused: Not on file     Forced sexual activity: Not on file   Other Topics Concern    Not on file   Social History Narrative    Not on file       Current Outpatient Medications   Medication Sig Dispense Refill    albuterol (PROVENTIL HFA, VENTOLIN HFA, PROAIR HFA) 90 mcg/actuation inhaler Take 1 Puff by inhalation every four (4) hours as needed for Wheezing.  estradioL (ESTRACE) 0.5 mg tablet Take 1 Tab by mouth daily. 30 Tab 12    conjugated estrogens (PREMARIN) 0.625 mg tablet Take 1 Tab by mouth daily. 30 Tab 12    estradioL (CLIMARA) 0.05 mg/24 hr 1 Patch by TransDERmal route Every Saturday. 4 Patch 12    ibuprofen (MOTRIN) 600 mg tablet Take 1 Tab by mouth every six (6) hours as needed for Pain. 100 Tab 3    docusate sodium (COLACE) 100 mg capsule Take 1 Cap by mouth two (2) times a day. 60 Cap 3    ferrous sulfate 325 mg (65 mg iron) tablet Take 1 Tab by mouth daily (with breakfast).  30 Tab 3       No Known Allergies    Review of Systems - History obtained from the patient  Constitutional: negative for weight loss, fever, night sweats  HEENT: negative for hearing loss, earache, congestion, snoring, sorethroat  CV: negative for chest pain, palpitations, edema  Resp: negative for cough, shortness of breath, wheezing  GI: negative for change in bowel habits, abdominal pain, black or bloody stools  : negative for frequency, dysuria, hematuria, vaginal discharge  MSK: negative for back pain, joint pain, muscle pain  Breast: negative for breast lumps, nipple discharge, galactorrhea  Skin :negative for itching, rash, hives  Neuro: negative for dizziness, headache, confusion, weakness  Psych: negative for anxiety, depression, change in mood  Heme/lymph: negative for bleeding, bruising, pallor    Physical Exam    Visit Vitals  /64   Ht 5' 4\" (1.626 m)   Wt 206 lb (93.4 kg)   LMP 11/26/2019   BMI 35.36 kg/m²       Constitutional  · Appearance: well-nourished, well developed, alert, in no acute distress    HENT  · Head and Face: appears normal    Neck  · Inspection/Palpation: normal appearance, no masses or tenderness  · Lymph Nodes: no lymphadenopathy present  · Thyroid: gland size normal, nontender, no nodules or masses present on palpation    Chest  · Respiratory Effort: non-labored breathing  · Auscultation: CTAB, normal breath sounds    Cardiovascular  · Heart:  · Auscultation: regular rate and rhythm without murmur  · Extremities: no peripheral edema    Breasts  · Inspection of Breasts: breasts symmetrical, no skin changes, no discharge present, nipple appearance normal, no skin retraction present  · Palpation of Breasts and Axillae: no masses present on palpation, no breast tenderness  · Axillary Lymph Nodes: no lymphadenopathy present    Gastrointestinal  · Abdominal Examination: abdomen non-tender to palpation, normal bowel sounds, no masses present  · Liver and spleen: no hepatomegaly present, spleen not palpable  · Hernias: no hernias identified    Genitourinary  · External Genitalia: normal appearance for age, no discharge present, no tenderness present, no inflammatory lesions present, no masses present, +atrophy of UG mucosa  · Vagina: normal vaginal vault without central or paravaginal defects, no discharge present, no inflammatory lesions present, no masses present  · Bladder: non-tender to palpation  · Urethra: appears normal  · Cervix: absent  · Perineum: perineum within normal limits, no evidence of trauma, no rashes or skin lesions present    Skin  · General Inspection: no rash, no lesions identified    Neurologic/Psychiatric  · Mental Status:  · Orientation: grossly oriented to person, place and time  · Mood and Affect: mood normal, affect appropriate      Assessment/Plan:  52 y.o. postmenopausal female overall doing well.      Health Maintenance:  -counseled re: diet, exercise, healthy lifestyle  -pap/HPV not indicated  -refer for mammo  -refer for colonoscopy  -refer for dexa scan    RTC: 1 year for annual wellness assessment, or sooner prn for problems or concerns  -handouts and instructions provided    Michelle Honeycutt  11/3/2020  10:52 AM     Signed By: Abisai Levy MD     November 3, 2020

## 2020-11-05 LAB — T PALLIDUM AB SER QL IA: NON REACTIVE

## 2020-11-06 LAB
A VAGINAE DNA VAG QL NAA+PROBE: ABNORMAL SCORE
BVAB2 DNA VAG QL NAA+PROBE: ABNORMAL SCORE
C ALBICANS DNA VAG QL NAA+PROBE: NEGATIVE
C GLABRATA DNA VAG QL NAA+PROBE: NEGATIVE
C TRACH DNA VAG QL NAA+PROBE: NEGATIVE
MEGA1 DNA VAG QL NAA+PROBE: ABNORMAL SCORE
N GONORRHOEA DNA VAG QL NAA+PROBE: NEGATIVE
T VAGINALIS DNA VAG QL NAA+PROBE: POSITIVE

## 2020-11-06 RX ORDER — METRONIDAZOLE 500 MG/1
500 TABLET ORAL 2 TIMES DAILY
Qty: 14 TAB | Refills: 0 | Status: SHIPPED | OUTPATIENT
Start: 2020-11-06 | End: 2020-11-13

## 2020-11-09 NOTE — PROGRESS NOTES
Per Dr. Marciano Laird- called and spoke with patient. Informed her of lab results. Patient verbalized understanding and had no questions at this time. Informed patient that partner will need to be treated as well for the trich.

## 2020-11-10 LAB
COMMENT, 144067: NORMAL
HCV AB S/CO SERPL IA: <0.1 S/CO RATIO (ref 0–0.9)

## 2020-11-16 ENCOUNTER — HOSPITAL ENCOUNTER (OUTPATIENT)
Dept: MAMMOGRAPHY | Age: 50
Discharge: HOME | End: 2020-11-16
Attending: OBSTETRICS & GYNECOLOGY
Payer: COMMERCIAL

## 2020-11-16 DIAGNOSIS — Z12.31 BREAST CANCER SCREENING BY MAMMOGRAM: ICD-10-CM

## 2020-11-16 PROCEDURE — 77067 SCR MAMMO BI INCL CAD: CPT

## 2020-11-30 ENCOUNTER — TELEPHONE (OUTPATIENT)
Dept: OBGYN CLINIC | Age: 50
End: 2020-11-30

## 2020-11-30 NOTE — TELEPHONE ENCOUNTER
Patient of Gomez Palencia to say that she was just treated for BV and Trich on 11/9/20. She said that she had some improvement after taking all 14 tablets of the Flagyl but not fully. She has some vaginal irritation and burning. Urination burns sometimes. Itching and white thin vaginal discharge. No odor. She said she wanted to see if this should be expected or should she have another round of medication or be checked? Please advise.       Kaci Hanson

## 2020-12-11 ENCOUNTER — TELEPHONE (OUTPATIENT)
Dept: OBGYN CLINIC | Age: 50
End: 2020-12-11

## 2020-12-14 RX ORDER — ESTRADIOL 0.5 MG/1
0.5 TABLET ORAL DAILY
Qty: 90 TAB | Refills: 3 | Status: SHIPPED | OUTPATIENT
Start: 2020-12-14

## 2021-01-04 ENCOUNTER — OFFICE VISIT (OUTPATIENT)
Dept: OBGYN CLINIC | Age: 51
End: 2021-01-04
Payer: COMMERCIAL

## 2021-01-04 VITALS
HEIGHT: 64 IN | DIASTOLIC BLOOD PRESSURE: 84 MMHG | SYSTOLIC BLOOD PRESSURE: 124 MMHG | BODY MASS INDEX: 36.02 KG/M2 | WEIGHT: 211 LBS

## 2021-01-04 DIAGNOSIS — Z86.19 HISTORY OF TRICHOMONIASIS: Primary | ICD-10-CM

## 2021-01-04 PROCEDURE — 99213 OFFICE O/P EST LOW 20 MIN: CPT | Performed by: OBSTETRICS & GYNECOLOGY

## 2021-01-04 NOTE — PROGRESS NOTES
Problem Visit  CC: Test of cure    HPI: Ms. Lui Borja is a 52 y.o. No obstetric history on file. female presenting for test of cure. Here for VAZQUEZ for trich. Hot flashes much improved with estrace! OB History    No obstetric history on file. Past Medical History:   Diagnosis Date    Asthma     Heavy menses        Past Surgical History:   Procedure Laterality Date    HX BREAST BIOPSY Right ?2000    Benign surgical bx    RAMAKRISHNA US BX BREAST RT 1ST LESION W/CLIP AND SPECIMEN Right 2016    Neg       Family History   Problem Relation Age of Onset    Hypertension Mother     Asthma Father     Asthma Maternal Grandmother     Cancer Maternal Grandmother         ?  TYPE CA    Diabetes Neg Hx     Heart Disease Neg Hx        Social History     Socioeconomic History    Marital status: SINGLE     Spouse name: Not on file    Number of children: Not on file    Years of education: Not on file    Highest education level: Not on file   Occupational History    Not on file   Social Needs    Financial resource strain: Not on file    Food insecurity     Worry: Not on file     Inability: Not on file    Transportation needs     Medical: Not on file     Non-medical: Not on file   Tobacco Use    Smoking status: Never Smoker    Smokeless tobacco: Never Used   Substance and Sexual Activity    Alcohol use: Not Currently    Drug use: No    Sexual activity: Not Currently     Partners: Male   Lifestyle    Physical activity     Days per week: Not on file     Minutes per session: Not on file    Stress: Not on file   Relationships    Social connections     Talks on phone: Not on file     Gets together: Not on file     Attends Restorationist service: Not on file     Active member of club or organization: Not on file     Attends meetings of clubs or organizations: Not on file     Relationship status: Not on file    Intimate partner violence     Fear of current or ex partner: Not on file     Emotionally abused: Not on file     Physically abused: Not on file     Forced sexual activity: Not on file   Other Topics Concern    Not on file   Social History Narrative    Not on file       Current Outpatient Medications   Medication Sig Dispense Refill    estradioL (ESTRACE) 0.5 mg tablet Take 1 Tab by mouth daily. 90 Tab 3    ibuprofen (MOTRIN) 600 mg tablet Take 1 Tab by mouth every six (6) hours as needed for Pain. 100 Tab 3    docusate sodium (COLACE) 100 mg capsule Take 1 Cap by mouth two (2) times a day. 60 Cap 3    ferrous sulfate 325 mg (65 mg iron) tablet Take 1 Tab by mouth daily (with breakfast). 30 Tab 3    albuterol (PROVENTIL HFA, VENTOLIN HFA, PROAIR HFA) 90 mcg/actuation inhaler Take 1 Puff by inhalation every four (4) hours as needed for Wheezing.  conjugated estrogens (PREMARIN) 0.625 mg tablet Take 1 Tab by mouth daily.  30 Tab 12       No Known Allergies    Physical Exam    Visit Vitals  /84 (BP 1 Location: Right arm, BP Patient Position: Sitting)   Ht 5' 4\" (1.626 m)   Wt 211 lb (95.7 kg)   LMP 11/26/2019   BMI 36.22 kg/m²       HENT  · Head and Face: appears normal    Genitourinary  · External Genitalia: normal appearance for age, no discharge present, no tenderness present, no inflammatory lesions present, no masses present, no atrophy present  · Vagina: normal vaginal vault without central or paravaginal defects, no inflammatory lesions present, no masses present, no discharge present  · Bladder: non-tender to palpation  · Urethra: appears normal  · Cervix: normal, no cervicitis, no CMT  · Uterus: normal size, shape and consistency, mobile  · Adnexa: no adnexal tenderness present, no adnexal masses present  · Perineum: perineum within normal limits, no evidence of trauma, no rashes or skin lesions present    Skin  · General Inspection: no rash, no lesions identified    Neurologic/Psychiatric  · Mental Status:  · Orientation: grossly oriented to person, place and time  · Mood and Affect: mood normal, affect appropriate    Results for orders placed or performed in visit on 11/03/20   NUSWAB VAGINITIS PLUS   Result Value Ref Range    Atopobium vaginae High - 2 (A) Score    BVAB 2 High - 2 (A) Score    Megasphaera 1 High - 2 (A) Score    C. albicans, AMARI Negative Negative    C. glabrata, AMARI Negative Negative    T. vaginalis, AMARI Positive (A) Negative    C. trachomatis, AMARI Negative Negative    N. gonorrhoeae, AMARI Negative Negative   HEP B SURFACE AG   Result Value Ref Range    Hepatitis B surface Ag <0.10 Index    Hep B surface Ag Interp. Negative Negative     HCV AB W/REFLEX VERIFICATION   Result Value Ref Range    HCV Ab <0.1 0.0 - 0.9 s/co ratio   T PALLIDUM SCREEN W/REFLEX   Result Value Ref Range    T PALLIDUM AB Non Reactive Non Reactive   HIV 1/2 AG/AB, 4TH GENERATION,W RFLX CONFIRM   Result Value Ref Range    HIV 1/2 Interpretation NONREACTIVE NONREACTIVE      HIV 1/2 result comment SEE NOTE     SAMPLES BEING HELD   Result Value Ref Range    SAMPLES BEING HELD 1SST     COMMENT        Add-on orders for these samples will be processed based on acceptable specimen integrity and analyte stability, which may vary by analyte. HCV COMMENT   Result Value Ref Range    Comment Comment           Assessment/Plan:  VAZQUEZ sent    RTC: for annual , or sooner prn for problems or concerns. Handouts and instructions provided.     Tyler Dunaway  1/4/2021  10:09 AM

## 2021-01-07 LAB
A VAGINAE DNA VAG QL NAA+PROBE: NORMAL SCORE
BVAB2 DNA VAG QL NAA+PROBE: NORMAL SCORE
C ALBICANS DNA VAG QL NAA+PROBE: NEGATIVE
C GLABRATA DNA VAG QL NAA+PROBE: NEGATIVE
C TRACH DNA VAG QL NAA+PROBE: NEGATIVE
MEGA1 DNA VAG QL NAA+PROBE: NORMAL SCORE
N GONORRHOEA DNA VAG QL NAA+PROBE: NEGATIVE
SPECIMEN STATUS REPORT, ROLRST: NORMAL
T VAGINALIS DNA VAG QL NAA+PROBE: NEGATIVE

## 2021-06-30 ENCOUNTER — TELEPHONE (OUTPATIENT)
Dept: OBGYN CLINIC | Age: 51
End: 2021-06-30

## 2021-06-30 NOTE — TELEPHONE ENCOUNTER
Patient called in with concerns about hot flashes. Patient states at first she was just having them at night but now she is having them all day long. Patient states the estrace is no longer helping. Patient is wondering if she can change the dosage or needs to come in for a visit.

## 2021-07-01 NOTE — TELEPHONE ENCOUNTER
Have her try taking two pills a day (doubling up) for a few weeks and see if there is any improvement

## 2021-07-01 NOTE — TELEPHONE ENCOUNTER
Called and spoke with patient. Informed her of Dr. Charanjit Shay recommendations and patient verbalized understanding. Will report back in a few weeks if no improvement.

## 2022-02-08 ENCOUNTER — OFFICE VISIT (OUTPATIENT)
Dept: OBGYN CLINIC | Age: 52
End: 2022-02-08
Payer: COMMERCIAL

## 2022-02-08 VITALS — WEIGHT: 204 LBS | SYSTOLIC BLOOD PRESSURE: 138 MMHG | DIASTOLIC BLOOD PRESSURE: 86 MMHG | BODY MASS INDEX: 35.02 KG/M2

## 2022-02-08 DIAGNOSIS — N95.1 VASOMOTOR SYMPTOMS DUE TO MENOPAUSE: ICD-10-CM

## 2022-02-08 DIAGNOSIS — Z01.419 ENCOUNTER FOR GYNECOLOGICAL EXAMINATION WITHOUT ABNORMAL FINDING: Primary | ICD-10-CM

## 2022-02-08 DIAGNOSIS — B37.9 CANDIDA INFECTION: ICD-10-CM

## 2022-02-08 DIAGNOSIS — Z11.3 SCREEN FOR STD (SEXUALLY TRANSMITTED DISEASE): ICD-10-CM

## 2022-02-08 PROBLEM — I10 HYPERTENSION: Status: ACTIVE | Noted: 2022-02-08

## 2022-02-08 PROBLEM — J45.909 ASTHMA: Status: ACTIVE | Noted: 2022-02-08

## 2022-02-08 PROCEDURE — 99396 PREV VISIT EST AGE 40-64: CPT | Performed by: OBSTETRICS & GYNECOLOGY

## 2022-02-08 RX ORDER — PAROXETINE 10 MG/1
10 TABLET, FILM COATED ORAL DAILY
Qty: 90 TABLET | Refills: 3 | Status: SHIPPED | OUTPATIENT
Start: 2022-02-08 | End: 2023-02-03

## 2022-02-08 RX ORDER — NYSTATIN 100000 U/G
OINTMENT TOPICAL 2 TIMES DAILY
Qty: 15 G | Refills: 0 | Status: SHIPPED | OUTPATIENT
Start: 2022-02-08

## 2022-02-08 NOTE — PROGRESS NOTES
Annual exam    Montse Ace is a 54 y.o. presenting for annual exam.   Her main concerns today include some hot flashes. Has tried estrogen without improvement. No issues with vaginal dryness or pain with sex. Pt requests STD testing (urine/blood). Ob/Gyn Hx:  No obstetric history on file. Patient's last menstrual period was 11/26/2019. Menopause 49  ? VMS-hot flashes  ? Vag dryness-none  ? HRT-Estrace was not helping  STI- yes  ? SA-yes    Health maintenance:    Mammo-11/2020-due  Colonoscopy- has not had one- info given today. Past Medical History:   Diagnosis Date    Asthma     Heavy menses        Past Surgical History:   Procedure Laterality Date    HX BREAST BIOPSY Right ?2000    Benign surgical bx    HX HYSTERECTOMY  11/26/2019    Robotic assisted total laparoscopic hysterectomy bilateral salpingectomy, right oophorectomy    RAMAKRISHNA US BX BREAST RT 1ST LESION W/CLIP AND SPECIMEN Right 2016    Neg       Family History   Problem Relation Age of Onset    Hypertension Mother     Asthma Father     Asthma Maternal Grandmother     Cancer Maternal Grandmother         ? TYPE CA    Diabetes Neg Hx     Heart Disease Neg Hx        Social History     Socioeconomic History    Marital status: SINGLE     Spouse name: Not on file    Number of children: Not on file    Years of education: Not on file    Highest education level: Not on file   Occupational History    Not on file   Tobacco Use    Smoking status: Never Smoker    Smokeless tobacco: Never Used   Vaping Use    Vaping Use: Never used   Substance and Sexual Activity    Alcohol use:  Yes    Drug use: No    Sexual activity: Not Currently     Partners: Male   Other Topics Concern    Not on file   Social History Narrative    Not on file     Social Determinants of Health     Financial Resource Strain:     Difficulty of Paying Living Expenses: Not on file   Food Insecurity:     Worried About Running Out of Food in the Last Year: Not on file    Ran Out of Food in the Last Year: Not on file   Transportation Needs:     Lack of Transportation (Medical): Not on file    Lack of Transportation (Non-Medical): Not on file   Physical Activity:     Days of Exercise per Week: Not on file    Minutes of Exercise per Session: Not on file   Stress:     Feeling of Stress : Not on file   Social Connections:     Frequency of Communication with Friends and Family: Not on file    Frequency of Social Gatherings with Friends and Family: Not on file    Attends Tenriism Services: Not on file    Active Member of [address] [place] [address] [employer] or Organizations: Not on file    Attends Club or Organization Meetings: Not on file    Marital Status: Not on file   Intimate Partner Violence:     Fear of Current or Ex-Partner: Not on file    Emotionally Abused: Not on file    Physically Abused: Not on file    Sexually Abused: Not on file   Housing Stability:     Unable to Pay for Housing in the Last Year: Not on file    Number of [place] in the Last Year: Not on file    Unstable Housing in the Last Year: Not on file       Current Outpatient Medications   Medication Sig Dispense Refill    ibuprofen (MOTRIN) 600 mg tablet Take 1 Tab by mouth every six (6) hours as needed for Pain. 100 Tab 3    albuterol (PROVENTIL HFA, VENTOLIN HFA, PROAIR HFA) 90 mcg/actuation inhaler Take 1 Puff by inhalation every four (4) hours as needed for Wheezing.  estradioL (ESTRACE) 0.5 mg tablet Take 1 Tab by mouth daily. (Patient not taking: Reported on 2/8/2022) 90 Tab 3    docusate sodium (COLACE) 100 mg capsule Take 1 Cap by mouth two (2) times a day. 60 Cap 3    ferrous sulfate 325 mg (65 mg iron) tablet Take 1 Tab by mouth daily (with breakfast).  30 Tab 3       No Known Allergies    Physical Exam    Visit Vitals  /86 (BP 1 Location: Left upper arm, BP Patient Position: Sitting)   Wt 204 lb (92.5 kg)   LMP 11/26/2019   BMI 35.02 kg/m²       Constitutional  · Appearance: well-nourished, well developed, alert, in no acute distress, obese    HENT  · Head and Face: appears normal    Neck  · Inspection/Palpation: normal appearance, no masses or tenderness  · Lymph Nodes: no lymphadenopathy present  · Thyroid: gland size normal, nontender, no nodules or masses present on palpation    Chest  · Respiratory Effort: non-labored breathing  · Auscultation: CTAB, normal breath sounds    Cardiovascular  · Heart:  · Auscultation: regular rate and rhythm without murmur  · Extremities: no peripheral edema    Breasts  · Inspection of Breasts: breasts symmetrical, yeast under breast bilaterally, no discharge present, nipple appearance normal, no skin retraction present  · Palpation of Breasts and Axillae: no masses present on palpation, no breast tenderness  · Axillary Lymph Nodes: no lymphadenopathy present    Gastrointestinal  · Abdominal Examination: abdomen non-tender to palpation, normal bowel sounds, no masses present  · Liver and spleen: no hepatomegaly present, spleen not palpable  · Hernias: no hernias identified    Genitourinary  · External Genitalia: normal appearance for age, no discharge present, no tenderness present, no inflammatory lesions present, no masses present, +atrophy of UG mucosa  · Vagina: normal vaginal vault without central or paravaginal defects, no discharge present, no inflammatory lesions present, no masses present  · Bladder: non-tender to palpation  · Urethra: appears normal  · Cervix:  Surgically absent  · Perineum: perineum within normal limits, no evidence of trauma, no rashes or skin lesions present    Skin  · General Inspection: no rash, no lesions identified    Neurologic/Psychiatric  · Mental Status:  · Orientation: grossly oriented to person, place and time  · Mood and Affect: mood normal, affect appropriate      Assessment/Plan:  54 y.o. postmenopausal female overall doing well with hot flashes and skin yeast infection under breast.     Health Maintenance:  -counseled re: diet, exercise, healthy lifestyle  -refer for mammo- info given  -refer for colonoscopy- info given    Current Problem:  - hot flashes: no improvement with systemic estrogen. Discussed SSRI, clonidine and gabapenitin. Risk/benefits reviewed. Pt opts for SSRI. Rx for paxil sent.    - Yeast: nystatin sent  - Discussed importance of taking amlodipine for HTN      RTC: 1 year for annual wellness assessment, or sooner prn for problems or concerns  -handouts and instructions provided    Albin Shane  2/8/2022  2:41 PM     Yony Leal MD

## 2022-02-09 ENCOUNTER — TRANSCRIBE ORDER (OUTPATIENT)
Dept: SCHEDULING | Age: 52
End: 2022-02-09

## 2022-02-09 DIAGNOSIS — Z01.419 ENCOUNTER FOR GYNECOLOGICAL EXAMINATION WITHOUT ABNORMAL FINDING: ICD-10-CM

## 2022-02-09 DIAGNOSIS — Z12.31 SCREENING MAMMOGRAM FOR HIGH-RISK PATIENT: Primary | ICD-10-CM

## 2022-02-10 LAB
C TRACH RRNA SPEC QL NAA+PROBE: NEGATIVE
HBV SURFACE AG SER QL: <0.1 INDEX
HBV SURFACE AG SER QL: NEGATIVE
HIV 1+2 AB+HIV1 P24 AG SERPL QL IA: NONREACTIVE
HIV12 RESULT COMMENT, HHIVC: NORMAL
N GONORRHOEA RRNA SPEC QL NAA+PROBE: NEGATIVE
RPR SER QL: NONREACTIVE
SPECIMEN STATUS REPORT, ROLRST: NORMAL
T VAGINALIS DNA SPEC QL NAA+PROBE: NEGATIVE

## 2022-02-11 LAB
HCV AB S/CO SERPL IA: <0.1 S/CO RATIO (ref 0–0.9)
HCV AB SERPL QL IA: NORMAL

## 2022-02-17 ENCOUNTER — HOSPITAL ENCOUNTER (OUTPATIENT)
Dept: MAMMOGRAPHY | Age: 52
Discharge: HOME | End: 2022-02-17
Attending: OBSTETRICS & GYNECOLOGY
Payer: COMMERCIAL

## 2022-02-17 DIAGNOSIS — Z12.31 SCREENING MAMMOGRAM FOR HIGH-RISK PATIENT: ICD-10-CM

## 2022-02-17 PROCEDURE — 77067 SCR MAMMO BI INCL CAD: CPT

## 2022-03-16 ENCOUNTER — HOSPITAL ENCOUNTER (OUTPATIENT)
Dept: MAMMOGRAPHY | Age: 52
Discharge: HOME | End: 2022-03-16
Attending: OBSTETRICS & GYNECOLOGY
Payer: COMMERCIAL

## 2022-03-16 DIAGNOSIS — R92.8 ABNORMAL MAMMOGRAM: ICD-10-CM

## 2022-03-16 PROCEDURE — 77061 BREAST TOMOSYNTHESIS UNI: CPT

## 2022-03-16 PROCEDURE — 76642 ULTRASOUND BREAST LIMITED: CPT

## 2022-03-29 ENCOUNTER — HOSPITAL ENCOUNTER (OUTPATIENT)
Dept: MAMMOGRAPHY | Age: 52
Discharge: HOME | End: 2022-03-29
Attending: OBSTETRICS & GYNECOLOGY
Payer: COMMERCIAL

## 2022-03-29 DIAGNOSIS — R92.8 ABNORMAL MAMMOGRAM: ICD-10-CM

## 2022-03-29 PROCEDURE — 19083 BX BREAST 1ST LESION US IMAG: CPT

## 2022-03-29 PROCEDURE — 77065 DX MAMMO INCL CAD UNI: CPT

## 2022-03-29 PROCEDURE — 88305 TISSUE EXAM BY PATHOLOGIST: CPT

## 2022-03-29 PROCEDURE — 74011000250 HC RX REV CODE- 250: Performed by: RADIOLOGY

## 2022-03-29 RX ORDER — LIDOCAINE HYDROCHLORIDE 10 MG/ML
5 INJECTION INFILTRATION; PERINEURAL
Status: COMPLETED | OUTPATIENT
Start: 2022-03-29 | End: 2022-03-29

## 2022-03-29 RX ORDER — LIDOCAINE HYDROCHLORIDE AND EPINEPHRINE 10; 10 MG/ML; UG/ML
20 INJECTION, SOLUTION INFILTRATION; PERINEURAL ONCE
Status: COMPLETED | OUTPATIENT
Start: 2022-03-29 | End: 2022-03-29

## 2022-03-29 RX ADMIN — LIDOCAINE HYDROCHLORIDE,EPINEPHRINE BITARTRATE 200 MG: 10; .01 INJECTION, SOLUTION INFILTRATION; PERINEURAL at 08:30

## 2022-03-29 RX ADMIN — LIDOCAINE HYDROCHLORIDE 5 ML: 10 INJECTION, SOLUTION INFILTRATION; PERINEURAL at 08:30

## 2022-03-31 NOTE — PROGRESS NOTES
Pathology approved by Dr. Zeus Ybarra. Called patient and relayed benign results. Advised patient that she should continue her annual mammogram schedule. She reports that her biopsy site is healing well with no concerns. Encouraged her to call with any question or concerns.

## (undated) DEVICE — PAD BD MATTRESS 73X32 IN STD CONVOLUTED FOAM LTX FREE

## (undated) DEVICE — SYR 10ML LUER LOK 1/5ML GRAD --

## (undated) DEVICE — TRI-LUMEN FILTERED TUBE SET WITH ACTIVATED CHARCOAL FILTER: Brand: AIRSEAL

## (undated) DEVICE — ELECTRO LUBE IS A SINGLE PATIENT USE DEVICE THAT IS INTENDED TO BE USED ON ELECTROSURGICAL ELECTRODES TO REDUCE STICKING.: Brand: KEY SURGICAL ELECTRO LUBE

## (undated) DEVICE — REM POLYHESIVE ADULT PATIENT RETURN ELECTRODE: Brand: VALLEYLAB

## (undated) DEVICE — DRAPE,ROBOTICS,STERILE: Brand: MEDLINE

## (undated) DEVICE — OBTRTR BLDELSS 8MM DISP -- DA VINCI - SNGL USE

## (undated) DEVICE — INSUFFLATION NEEDLE TO ESTABLISH PNEUMOPERITONEUM.: Brand: INSUFFLATION NEEDLE

## (undated) DEVICE — STERILE POLYISOPRENE POWDER-FREE SURGICAL GLOVES WITH EMOLLIENT COATING: Brand: PROTEXIS

## (undated) DEVICE — TOTAL TRAY, DB, 100% SILI FOLEY, 16FR 10: Brand: MEDLINE

## (undated) DEVICE — HANDLE LT SNAP ON ULT DURABLE LENS FOR TRUMPF ALC DISPOSABLE

## (undated) DEVICE — SURGICAL PROCEDURE PACK GYN LAPAROSCOPY CUST SMH LF

## (undated) DEVICE — Device

## (undated) DEVICE — PAD,SANITARY,11 IN,MAXI,N-STRL,IND WRAP: Brand: MEDLINE

## (undated) DEVICE — TRAY PREP DRY W/ PREM GLV 2 APPL 6 SPNG 2 UNDPD 1 OVERWRAP

## (undated) DEVICE — VCARE LARGE, UTERINE MANIPULATOR, VAGINAL-CERVICAL-AHLUWALIA'S-RETRACTOR-ELEVATOR: Brand: VCARE

## (undated) DEVICE — AIRSEAL 8 MM ACCESS PORT AND LOW PROFILE OBTURATOR WITH BLADELESS OPTICAL TIP, 120 MM LENGTH: Brand: AIRSEAL

## (undated) DEVICE — SEALANT FIBRIN 4 CC FRZN PRE FILLED SYR TISSEEL

## (undated) DEVICE — INFECTION CONTROL KIT SYS

## (undated) DEVICE — DRAPE,REIN 53X77,STERILE: Brand: MEDLINE

## (undated) DEVICE — SUTURE MCRYL SZ 4-0 L27IN ABSRB UD L19MM PS-2 1/2 CIR PRIM Y426H

## (undated) DEVICE — APPLICATOR BNDG 1MM ADH PREMIERPRO EXOFIN

## (undated) DEVICE — APPLICATOR SURG XL L38CM FOR ARISTA ABSRB HEMSTAT FLEXITIP

## (undated) DEVICE — (D)PREP SKN CHLRAPRP APPL 26ML -- CONVERT TO ITEM 371833

## (undated) DEVICE — VISUALIZATION SYSTEM: Brand: CLEARIFY

## (undated) DEVICE — TIP COVER ACCESSORY

## (undated) DEVICE — NEEDLE HYPO 22GA L1.5IN BLK S STL HUB POLYPR SHLD REG BVL

## (undated) DEVICE — DRAPE SURG EQUIP W105XH13XL20IN 3 ARM DISPOSABLE DA VINCI S

## (undated) DEVICE — STRAP,POSITIONING,KNEE/BODY,FOAM,4X60": Brand: MEDLINE

## (undated) DEVICE — AGENT HEMSTAT 3GM PURIFIED PLNT STARCH PWD ABSRB ARISTA AH

## (undated) DEVICE — SUTURE ABSRB L30CM 2-0 VLT SPRL PDS + STRATAFIX SXPP1B410

## (undated) DEVICE — GARMENT,MEDLINE,DVT,INT,CALF,MED, GEN2: Brand: MEDLINE

## (undated) DEVICE — APPLICATOR ENDOSCP 30 CM W/ SNAP LCK DUPLOSPRAY MIS

## (undated) DEVICE — SOLUTION IV 1000ML 0.9% SOD CHL